# Patient Record
Sex: MALE | Race: WHITE | Employment: OTHER | URBAN - METROPOLITAN AREA
[De-identification: names, ages, dates, MRNs, and addresses within clinical notes are randomized per-mention and may not be internally consistent; named-entity substitution may affect disease eponyms.]

---

## 2017-11-01 ENCOUNTER — OFFICE VISIT (OUTPATIENT)
Dept: CARDIOLOGY CLINIC | Age: 62
End: 2017-11-01

## 2017-11-01 VITALS
BODY MASS INDEX: 37.16 KG/M2 | RESPIRATION RATE: 18 BRPM | WEIGHT: 274 LBS | DIASTOLIC BLOOD PRESSURE: 78 MMHG | HEART RATE: 81 BPM | OXYGEN SATURATION: 97 % | SYSTOLIC BLOOD PRESSURE: 126 MMHG

## 2017-11-01 DIAGNOSIS — I10 ESSENTIAL HYPERTENSION, BENIGN: ICD-10-CM

## 2017-11-01 DIAGNOSIS — E11.9 TYPE 2 DIABETES MELLITUS WITHOUT COMPLICATION, WITHOUT LONG-TERM CURRENT USE OF INSULIN (HCC): ICD-10-CM

## 2017-11-01 DIAGNOSIS — I25.10 ATHEROSCLEROSIS OF NATIVE CORONARY ARTERY OF NATIVE HEART WITHOUT ANGINA PECTORIS: ICD-10-CM

## 2017-11-01 DIAGNOSIS — E78.5 DYSLIPIDEMIA: Primary | ICD-10-CM

## 2017-11-01 DIAGNOSIS — E66.09 CLASS 2 OBESITY DUE TO EXCESS CALORIES WITH BODY MASS INDEX (BMI) OF 37.0 TO 37.9 IN ADULT, UNSPECIFIED WHETHER SERIOUS COMORBIDITY PRESENT: ICD-10-CM

## 2017-11-01 RX ORDER — FAMOTIDINE 10 MG/1
20 TABLET ORAL 2 TIMES DAILY
COMMUNITY

## 2017-11-01 RX ORDER — BUPROPION HYDROCHLORIDE 150 MG/1
TABLET, EXTENDED RELEASE ORAL 2 TIMES DAILY
COMMUNITY

## 2017-11-01 NOTE — MR AVS SNAPSHOT
Visit Information Date & Time Provider Department Dept. Phone Encounter #  
 11/1/2017  8:00 AM Som Kenny MD [facility] Anju Carballo [phone] [id number] Follow-up Instructions Return in about 1 year (around 11/1/2018). Upcoming Health Maintenance Date Due Hepatitis C Screening 1955 FOOT EXAM Q1 12/20/1963 MICROALBUMIN Q1 12/20/1963 EYE EXAM RETINAL OR DILATED Q1 12/20/1963 Pneumococcal 19-64 Medium Risk (1 of 1 - PPSV23) 12/20/1972 DTaP/Tdap/Td series (1 - Tdap) 12/20/1974 FOBT Q 1 YEAR AGE 50-75 12/20/2003 ZOSTER VACCINE AGE 60> 10/20/2013 HEMOGLOBIN A1C Q6M 12/28/2016 INFLUENZA AGE 9 TO ADULT 8/1/2017 LIPID PANEL Q1 9/28/2017 Allergies as of 11/1/2017  Review Complete On: 11/1/2017 By: Miryam Dorado LPN Severity Noted Reaction Type Reactions Beef Containing Products  07/01/2013    Other (comments) Diarrhea Milk Containing Products  07/01/2013    Other (comments) Diarrhea Pork Derived (Porcine)  07/01/2013    Other (comments) Diarrhea Current Immunizations  Never Reviewed No immunizations on file. Not reviewed this visit You Were Diagnosed With   
  
 Codes Comments Dyslipidemia    -  Primary ICD-10-CM: E78.5 ICD-9-CM: 272.4 Atherosclerosis of native coronary artery of native heart without angina pectoris     ICD-10-CM: I25.10 ICD-9-CM: 414.01 Essential hypertension, benign     ICD-10-CM: I10 
ICD-9-CM: 401.1 Class 2 obesity due to excess calories with body mass index (BMI) of 37.0 to 37.9 in adult, unspecified whether serious comorbidity present     ICD-10-CM: E66.09, K02.99 ICD-9-CM: 278.00, V85.37 Type 2 diabetes mellitus without complication, without long-term current use of insulin (HCC)     ICD-10-CM: E11.9 ICD-9-CM: 250.00 Vitals BP Pulse Resp Weight(growth percentile) SpO2 BMI  
 126/78 81 18 274 lb (124.3 kg) 97% 37.16 kg/m2 Smoking Status Never Smoker BMI and BSA Data Body Mass Index Body Surface Area  
 37.16 kg/m 2 2.51 m 2 Preferred Pharmacy Pharmacy Name Phone [address] [facility] [phone] Your Updated Medication List  
  
   
This list is accurate as of: 11/1/17  8:35 AM.  Always use your most recent med list. ALTACE 10 mg capsule Generic drug:  ramipril Take 10 mg by mouth daily. ANDROGEL TD  
by TransDERmal route. 5 pumps in the morning  
  
 aspirin 325 mg tablet Commonly known as:  ASPIRIN Take 81 mg by mouth daily. BETAMETHASONE VALERATE EX  
by Apply Externally route. buPROPion  mg SR tablet Commonly known as:  Baig Mccreedy Take  by mouth two (2) times a day. ELEMENTAL MAGNESIUM PO Take 300 mg by mouth daily. ELIDEL 1 % topical cream  
Generic drug:  pimecrolimus Apply  to affected area two (2) times a day. famotidine 10 mg tablet Commonly known as:  PEPCID Take 20 mg by mouth two (2) times a day. fish oil-dha-epa 1,200-144-216 mg Cap Take 2 Tabs by mouth two (2) times a day. folic acid 122 mcg tablet Take 800 mcg by mouth daily. glimepiride 2 mg tablet Commonly known as:  AMARYL Take 4 mg by mouth two (2) times a day. GLUCOSAMINE-CHONDROITIN PO Take  by mouth. 1500/1200 mg bid. JANUMET 50-1,000 mg per tablet Generic drug:  SITagliptin-metFORMIN Take 1 Tab by mouth two (2) times daily (with meals). ketoconazole 2 % shampoo Commonly known as:  NIZORAL Apply  to affected area daily as needed. Lancets Misc  
by Does Not Apply route. LITHIUM CITRATE PO Take  by mouth.  
  
 loratadine 10 mg tablet Commonly known as:  Ismael Van Take 10 mg by mouth.  
  
 losartan 100 mg tablet Commonly known as:  COZAAR Take 100 mg by mouth daily. multivitamin, stress formula tablet Commonly known as:  STRESS TAB Take 1 Tab by mouth daily. omeprazole 40 mg capsule Commonly known as:  PRILOSEC Take 40 mg by mouth daily. ONE TOUCH ULTRA BONUS PACK  
by Does Not Apply route. POTASSIUM CHLORIDE SR 10 MEQ TAB Take 20 mEq by mouth two (2) times a day. simvastatin 40 mg tablet Commonly known as:  ZOCOR Take  by mouth nightly. TEKTURNA 150 mg tablet Generic drug:  aliskiren Take  by mouth daily. tolterodine 2 mg tablet Commonly known as:  Tramaine Fusi Take 2 mg by mouth two (2) times a day. TRICOR 145 mg tablet Generic drug:  fenofibrate nanocrystallized Take  by mouth daily. VITAMIN D3 1,000 unit Cap Generic drug:  cholecalciferol Take 5,000 Units by mouth daily. We Performed the Following AMB POC EKG ROUTINE W/ 12 LEADS, INTER & REP [02896 CPT(R)] Follow-up Instructions Return in about 1 year (around 11/1/2018). Patient Instructions Decrease Ramipril to 1/2 tablet daily. Introducing [facility]! [facility] introduces [employer]ADE patient portal. Now you can access parts of your medical record, email your doctor's office, and request medication refills online. 1. In your internet browser, go to [email or web address] 2. Click on the First Time User? Click Here link in the Sign In box. You will see the New Member Sign Up page. 3. Enter your COADE Access Code exactly as it appears below. You will not need to use this code after youve completed the sign-up process. If you do not sign up before the expiration date, you must request a new code. · COADE Access Code: [email or web address] Expires: 1/30/2018  8:35 AM 
 
4. Enter the last four digits of your Social Security Number (xxxx) and Date of Birth (mm/dd/yyyy) as indicated and click Submit. You will be taken to the next sign-up page. 5. Create a Socr[employer] ID. This will be your Socr[employer] login ID and cannot be changed, so think of one that is secure and easy to remember. 6. Create a [employer] password. You can change your password at any time. 7. Enter your Password Reset Question and Answer. This can be used at a later time if you forget your password. 8. Enter your e-mail address. You will receive e-mail notification when new information is available in [address] 9. Click Sign Up. You can now view and download portions of your medical record. 10. Click the Download Summary menu link to download a portable copy of your medical information. If you have questions, please visit the Frequently Asked Questions section of the [employer] website. Remember, Socrata is NOT to be used for urgent needs. For medical emergencies, dial 911. Now available from your iPhone and Android! Please provide this summary of care documentation to your next provider. Your primary care clinician is listed as Shayna Richey. If you have any questions after today's visit, please call [phone].

## 2017-11-01 NOTE — PROGRESS NOTES
HISTORY OF PRESENT ILLNESS  Lisset Adames is a 59 y.o. male. Last seen 1 year ago. Problem List  Date Reviewed: 7/11/2011          Codes Class Noted    Dyslipidemia ICD-10-CM: E78.5  ICD-9-CM: 272.4  8/24/2015        Obesity ICD-10-CM: E66.9  ICD-9-CM: 278.00  6/29/2012        CAD (coronary artery disease) native artery ICD-10-CM: I25.10  ICD-9-CM: 414.01  4/13/2011    Overview Signed 7/11/2011  8:40 AM by Humaira Harvey MD     Nonobstructive plaque by cath 2007             Essential hypertension, benign ICD-10-CM: I10  ICD-9-CM: 401.1  4/13/2011        [facility] 2 diabetes mellitus) [facility] ICD-10-CM: E11.9  ICD-9-CM: 250.00  2/63/3604        Metabolic disorder mixed hyperlipidemia ICD-10-CM: S44.0  ICD-9-CM: 272.2  4/13/2011            Cardiac testing  cath 2007-Nonobstructive plaque    HPI    He feels well overall. He started going to [employer] last Friday and has been lifting weights thrice weekly with no exertional sxs. Criss Carlton He notes he felt dizzy standing up while in [facility] last month. He admits he may not have had enough water. He also notes leg cramping while hiking the [facility]. Patient denies any exertional chest pain, dyspnea, palpitations, syncope, orthopnea, edema or paroxysmal nocturnal dyspnea. He was diagnosed with depression a few months ago and has felt better on medication. Labs 9/29/17 reviewed- CRE 1.21, Hgb 6.8, , , HDL 34, LDL 78    Current Outpatient Prescriptions   Medication Sig Dispense Refill    buPROPion SR (WELLBUTRIN SR) 150 mg SR tablet Take  by mouth two (2) times a day.  famotidine (PEPCID) 10 mg tablet Take 20 mg by mouth two (2) times a day.  LITHIUM CITRATE PO Take  by mouth.  losartan (COZAAR) 100 mg tablet Take 100 mg by mouth daily.  omeprazole (PRILOSEC) 40 mg capsule Take 40 mg by mouth daily.  cholecalciferol (VITAMIN D3) 1,000 unit cap Take 5,000 Units by mouth daily.       POTASSIUM CHLORIDE SR 10 MEQ TAB Take 20 mEq by mouth two (2) times a day.  sitaGLIPtin-metFORMIN (JANUMET) 50-1,000 mg per tablet Take 1 Tab by mouth two (2) times daily (with meals).  Lancets misc by Does Not Apply route.  BLOOD-GLUCOSE METER (ONE TOUCH ULTRA BONUS PACK) by Does Not Apply route.  loratadine (CLARITIN) 10 mg tablet Take 10 mg by mouth.  BETAMETHASONE VALERATE EX by Apply Externally route.  fish oil-dha-epa 1,200-144-216 mg cap Take 2 Tabs by mouth two (2) times a day.  glimepiride (AMARYL) 2 mg tablet Take 4 mg by mouth two (2) times a day.  ketoconazole (NIZORAL) 2 % shampoo Apply  to affected area daily as needed.  simvastatin (ZOCOR) 40 mg tablet Take  by mouth nightly.  ramipril (ALTACE) 10 mg capsule Take 10 mg by mouth daily.  TESTOSTERONE (ANDROGEL TD) by TransDERmal route. 5 pumps in the morning      aspirin (ASPIRIN) 325 mg tablet Take 81 mg by mouth daily.  MAGNESIUM OXIDE/MAG AA CHELATE (ELEMENTAL MAGNESIUM PO) Take 300 mg by mouth daily.  folic acid 608 mcg tablet Take 800 mcg by mouth daily.  GLUCOSAMINE HCL/CHONDRO GIPSON A (GLUCOSAMINE-CHONDROITIN PO) Take  by mouth. 1500/1200 mg bid.  multivitamin, stress formula (STRESS TAB) tablet Take 1 Tab by mouth daily.  fenofibrate nanocrystallized (TRICOR) 145 mg tablet Take  by mouth daily.  tolterodine (DETROL) 2 mg tablet Take 2 mg by mouth two (2) times a day.  pimecrolimus (ELIDEL) 1 % topical cream Apply  to affected area two (2) times a day.  aliskiren (TEKTURNA) 150 mg tablet Take  by mouth daily. Allergies   Allergen Reactions    Beef Containing Products Other (comments)     Diarrhea    Milk Containing Products Other (comments)     Diarrhea    Pork Derived (Porcine) Other (comments)     Diarrhea     . [employer] real estate. Review of Systems   Constitutional: Negative for fever, chills, malaise/fatigue and diaphoresis.    Respiratory: Negative for cough, hemoptysis, sputum production, shortness of breath and wheezing. Cardiovascular: Negative for chest pain, palpitations, orthopnea, claudication, leg swelling and PND. Gastrointestinal: Negative for heartburn, nausea, vomiting, blood in stool and melena. Genitourinary: Negative for dysuria and flank pain. Musculoskeletal: Negative for back pain and joint pain. Positive for episodes of exertional leg cramping. Skin: Negative for rash. Neurological: Negative for focal weakness, seizures, loss of consciousness, weakness and headaches. Positive for episodes of dizziness. Endo/Heme/Allergies: Does not bruise/bleed easily. Psychiatric/Behavioral: Negative for memory loss. The patient does not have insomnia. Visit Vitals    /78    Pulse 81    Resp 18    Wt 274 lb (124.3 kg)    SpO2 97%    BMI 37.16 kg/m2     Wt Readings from Last 3 Encounters:   11/01/17 274 lb (124.3 kg)   08/29/16 273 lb 9.6 oz (124.1 kg)   08/24/15 279 lb (126.6 kg)     Physical Exam   General - well developed well nourished  Neck - JVP normal, thyroid normal  Cardiac - normal S1,S2, no murmurs, rubs or gallops. No clicks  Vascular - carotids without bruits, radials, femorals and pedal pulses equal bilateral  Lungs - clear to auscultation bilaterals, no rales, wheezing or rhonchi  Abd - soft nontender, no HSM, no abd bruits  Extremities - no edema  Skin - no rash  Neuro - nonfocal  Psych - normal mood and affect    Lipids 6/28/16 - A1c 7.8, , , HDL 29, LDL 95, glucose 179, BUN 19, Cr 1.04  Labs 9/29/17 reviewed- CRE 1.21, Hgb 6.8, , , HDL 34, LDL 78    Cardiographics  EKG 7/1/13 - SR, normal tracing  EKG 7/21/14 - sinus 56, normal   EKG 8/24/15 - sinus, normal  EKG 8/29/16 - SR, normal  EKG 11/1/17- SR, normal    ASSESSMENT and PLAN  Encounter Diagnoses   Name Primary?     Dyslipidemia Yes    Atherosclerosis of native coronary artery of native heart without angina pectoris     Essential hypertension, benign     Class 2 obesity due to excess calories with body mass index (BMI) of 37.0 to 37.9 in adult, unspecified whether serious comorbidity present     Type 2 diabetes mellitus without complication, without long-term current use of insulin [facility])      Mr. Ariana Woodruff has mild CAD by cath 2007 in the setting of DM, cardiometabolic syndrome. No sx of angina at a good functional capacity. Reserve stress testing for any concerning sxs. He is making strides to lose weight through [employer]. He seems fairly motivated. Recent A1c 6.9%. TG remains greater than 200. Normotensive on combination therapy. Strangely enough he is on Losartan and Ramipril. He does have some postural lightheadedness from time to time. Will decrease Ramipril to 5 mg daily with the idea of weaning this off completely. Recent lipids reviewed LDL-c optimized but TG greater than 200. Follow-up Disposition:  Return in about 1 year (around 11/1/2018).     Written by Tiara Rodriges, as dictated by Rebeka Milligan MD.  Rebeka Milligan MD

## 2017-11-05 RX ORDER — RAMIPRIL 5 MG/1
5 CAPSULE ORAL DAILY
Qty: 30 CAP | Refills: 3
Start: 2017-11-05 | End: 2019-01-02 | Stop reason: ALTCHOICE

## 2017-11-14 ENCOUNTER — TELEPHONE (OUTPATIENT)
Dept: CARDIOLOGY CLINIC | Age: 62
End: 2017-11-14

## 2017-11-14 NOTE — TELEPHONE ENCOUNTER
Pt would like to speak with you regarding a medication Dr. Carey Starks asked him to cut in half. Pt can be reached at [phone].     Thank you,  Jack Menchaca

## 2017-11-16 RX ORDER — RAMIPRIL 5 MG/1
5 CAPSULE ORAL DAILY
Qty: 30 CAP | Refills: 3 | Status: CANCELLED | OUTPATIENT
Start: 2017-11-16

## 2017-11-16 NOTE — TELEPHONE ENCOUNTER
Patient states he is now taking ramipril 5mg every other day. He will continue to monitor BP and notify us of any changes.

## 2017-11-16 NOTE — TELEPHONE ENCOUNTER
Please call patient in regards to decreasing the dosage of ramipril to 2.5 mg. States that he is unable to cut the tablet in half. Requests a call back at [phone]. Thanks!

## 2018-06-05 ENCOUNTER — HOSPITAL ENCOUNTER (EMERGENCY)
Age: 63
Discharge: HOME | End: 2018-06-05
Attending: EMERGENCY MEDICINE
Payer: COMMERCIAL

## 2018-06-05 ENCOUNTER — APPOINTMENT (OUTPATIENT)
Dept: CT IMAGING | Age: 63
End: 2018-06-05
Attending: EMERGENCY MEDICINE
Payer: COMMERCIAL

## 2018-06-05 VITALS
DIASTOLIC BLOOD PRESSURE: 83 MMHG | SYSTOLIC BLOOD PRESSURE: 175 MMHG | HEART RATE: 52 BPM | TEMPERATURE: 97.4 F | BODY MASS INDEX: 34.65 KG/M2 | WEIGHT: 270 LBS | HEIGHT: 74 IN | OXYGEN SATURATION: 98 % | RESPIRATION RATE: 15 BRPM

## 2018-06-05 DIAGNOSIS — R10.31 ABDOMINAL PAIN, RIGHT LOWER QUADRANT: ICD-10-CM

## 2018-06-05 DIAGNOSIS — R10.9 FLANK PAIN: Primary | ICD-10-CM

## 2018-06-05 LAB
ANION GAP BLD CALC-SCNC: 19 MMOL/L (ref 10–20)
BUN BLD-MCNC: 29 MG/DL (ref 9–20)
CA-I BLD-MCNC: 1.21 MMOL/L (ref 1.12–1.32)
CHLORIDE BLD-SCNC: 104 MMOL/L (ref 98–107)
CO2 BLD-SCNC: 22 MMOL/L (ref 21–32)
CREAT BLD-MCNC: 0.8 MG/DL (ref 0.6–1.3)
GLUCOSE BLD-MCNC: 180 MG/DL (ref 65–100)
HCT VFR BLD CALC: 47 % (ref 36.6–50.3)
POTASSIUM BLD-SCNC: 4.7 MMOL/L (ref 3.5–5.1)
SERVICE CMNT-IMP: ABNORMAL
SODIUM BLD-SCNC: 140 MMOL/L (ref 136–145)

## 2018-06-05 PROCEDURE — 99283 EMERGENCY DEPT VISIT LOW MDM: CPT

## 2018-06-05 PROCEDURE — 74177 CT ABD & PELVIS W/CONTRAST: CPT

## 2018-06-05 PROCEDURE — 96374 THER/PROPH/DIAG INJ IV PUSH: CPT

## 2018-06-05 PROCEDURE — 96361 HYDRATE IV INFUSION ADD-ON: CPT

## 2018-06-05 PROCEDURE — 96375 TX/PRO/DX INJ NEW DRUG ADDON: CPT

## 2018-06-05 PROCEDURE — 74011636320 HC RX REV CODE- 636/320: Performed by: RADIOLOGY

## 2018-06-05 PROCEDURE — 74011250636 HC RX REV CODE- 250/636: Performed by: EMERGENCY MEDICINE

## 2018-06-05 PROCEDURE — 80047 BASIC METABLC PNL IONIZED CA: CPT

## 2018-06-05 RX ORDER — MORPHINE SULFATE 4 MG/ML
4 INJECTION INTRAVENOUS
Status: COMPLETED | OUTPATIENT
Start: 2018-06-05 | End: 2018-06-05

## 2018-06-05 RX ORDER — KETOROLAC TROMETHAMINE 30 MG/ML
15 INJECTION, SOLUTION INTRAMUSCULAR; INTRAVENOUS
Status: COMPLETED | OUTPATIENT
Start: 2018-06-05 | End: 2018-06-05

## 2018-06-05 RX ADMIN — SODIUM CHLORIDE 1000 ML: 900 INJECTION, SOLUTION INTRAVENOUS at 12:49

## 2018-06-05 RX ADMIN — IOPAMIDOL 97 ML: 755 INJECTION, SOLUTION INTRAVENOUS at 14:03

## 2018-06-05 RX ADMIN — MORPHINE SULFATE 4 MG: 4 INJECTION INTRAVENOUS at 12:49

## 2018-06-05 RX ADMIN — KETOROLAC TROMETHAMINE 15 MG: 30 INJECTION, SOLUTION INTRAMUSCULAR at 12:49

## 2018-06-05 NOTE — ED PROVIDER NOTES
HPI Comments: 57 y.o. male with past medical history significant for CAD, HTN, DM, cholecyctectomy and HLD who presents from home with chief complaint of abdominal pain. The pt c/o sudden onset RLQ pain that radiates to his back with reduced appetite and nausea at 0630. The pain at its worst was a 10/10 and it is not 7/10. The pain is worsened mechanically. The pt saw his PCP today and he did not think it was appendicitis. The pt also reports a subjective fever. The pt has had a kidney stone in the past. Review of PCP labs and paperwork shows 10-15 RBC in the urine and a CBC with values within normal limits. The pt denies vomiting, changes in stool, changes in urination, and hx of kidney disease. There are no other acute medical concerns at this time. Social hx: nonsmoker, EtOH use  PCP: Jennifer Meléndez MD    Note written by Kike Gabriel, as dictated by Juanito Husain. Hilario Motta MD 12:49 PM      The history is provided by the patient. No  was used. Past Medical History:   Diagnosis Date    CAD (coronary artery disease) native artery 4/13/2011    Dyslipidemia 8/24/2015    Essential hypertension, benign 5/92/1416    Metabolic disorder mixed hyperlipidemia 4/13/2011    Obesity 6/29/2012    Type II or unspecified type diabetes mellitus without mention of complication, not stated as uncontrolled 4/13/2011       No past surgical history on file. No family history on file. Social History     Social History    Marital status:      Spouse name: N/A    Number of children: N/A    Years of education: N/A     Occupational History    Not on file.      Social History Main Topics    Smoking status: Never Smoker    Smokeless tobacco: Never Used    Alcohol use 0.0 oz/week     0 Standard drinks or equivalent per week    Drug use: Not on file    Sexual activity: Not on file     Other Topics Concern    Not on file     Social History Narrative         ALLERGIES: Beef containing products; Milk containing products; and Pork derived (porcine)    Review of Systems   Constitutional: Positive for fever (subjective). Negative for chills. HENT: Negative for ear pain and sore throat. Eyes: Negative for pain. Respiratory: Negative for chest tightness and shortness of breath. Cardiovascular: Negative for chest pain and leg swelling. Gastrointestinal: Positive for abdominal pain and nausea. Negative for constipation, diarrhea and vomiting. Genitourinary: Negative for dysuria and flank pain. Musculoskeletal: Positive for back pain. Skin: Negative for rash. Neurological: Negative for headaches. All other systems reviewed and are negative. Vitals:    06/05/18 1106   BP: 175/83   Pulse: (!) 52   Resp: 15   Temp: 97.4 °F (36.3 °C)   SpO2: 98%   Weight: 122.5 kg (270 lb)   Height: 6' 2\" (1.88 m)            Physical Exam   Constitutional: No distress. HENT:   Head: Normocephalic and atraumatic. Eyes: No scleral icterus. Neck: No tracheal deviation present. Cardiovascular: Normal rate and regular rhythm. Exam reveals no gallop and no friction rub. No murmur heard. Pulmonary/Chest: Effort normal. No respiratory distress. Abdominal: He exhibits no distension. There is tenderness (RLQ). R  CVA tenderness   Musculoskeletal: He exhibits no deformity. Neurological: He is alert. Skin: Skin is warm and dry. Psychiatric: He has a normal mood and affect. Nursing note and vitals reviewed. Note written by Kike Patrick, as dictated by Jerilyn Stovall. Denys Bills MD 12:51 PM      MDM  Number of Diagnoses or Management Options  Abdominal pain, right lower quadrant:   Flank pain:   Diagnosis management comments: 68-year-old male with a past medical history kidney stones presents from his primary care doctors with right lower quadrant and right flank pain. Differential diagnosis includes appendicitis, kidney stones, muscular skeletal pain.   - CT scan of the abdomen and pelvis: NAD  - Chem-8  - Morphine, Toradol, IV fluids  - Follow up with PCP  - Given strict return precautions    Jose Arauz, MD      Patient Progress  Patient progress: improved        ED Course       Procedures

## 2018-06-05 NOTE — ED TRIAGE NOTES
Pt. C/o right side abdominal pain that radiates around to his back since 0630 this am with nausea. Saw Family Practice this am and sent here to r/o appendix/kidney stones. No dysuria noted. Denies any CP/SOB.

## 2018-06-05 NOTE — ED NOTES
Pt has CBC & Urinalysis run today At [facility]. Per Dr. Ela Whitaker, these results are sufficient. Labs and urinalysis will not be collected at this time.  Results scanned into pt chart

## 2018-06-05 NOTE — DISCHARGE INSTRUCTIONS
Possible Appendicitis: Care Instructions  Your Care Instructions    Your doctor thinks you may have appendicitis. This means that your appendix may be infected. The appendix is a small sac that is shaped like a finger. It's attached to your large intestine. Sometimes it's hard to tell if a person has appendicitis. It is especially hard to tell in children, pregnant women, and older adults. If your doctor thinks it's possible that you have appendicitis, he or she may want to order more tests. Or your doctor may want to wait to see if your symptoms change. Your doctor thinks it's okay for you to go home right now. But you will need to watch for symptoms of appendicitis at home. If your symptoms continue or get worse, it's important to call your doctor or get medical care right away. Appendicitis can get serious very quickly. The main treatment is surgery to remove your appendix. Follow-up care is a key part of your treatment and safety. Be sure to make and go to all appointments, and call your doctor if you are having problems. It's also a good idea to know your test results and keep a list of the medicines you take. How can you care for yourself at home? · Do not eat or drink, unless your doctor says it is okay. If you need surgery, it's best to have an empty stomach. If you're thirsty, you can rinse your mouth with water. Or you can suck on hard candy. · Do not take laxatives. If you have appendicitis, they can make the appendix burst.  · Follow your doctor's instructions about taking medicines. Your doctor may tell you not to take antibiotics or pain pills. These medicines can make it harder to tell if you have appendicitis. · Watch for symptoms of appendicitis. See the When should you call for help section below. It is very important to follow your doctor's instructions about getting treatment if you have these symptoms. When should you call for help?   Call your doctor now or seek immediate medical care if:  ? · You have pain that becomes focused on one area of your belly. ? · You have new or worse belly pain. ? · You are vomiting. ? · You have a fever. ? · You cannot pass stools or gas. ? Watch closely for changes in your health, and be sure to contact your doctor if:  ? · You do not get better as expected. Where can you learn more? Go to http://valentino-reta.info/. Enter C635 in the search box to learn more about \"Possible Appendicitis: Care Instructions. \"  Current as of: May 12, 2017  Content Version: 11.4  © 6030-3766 [employer]. Care instructions adapted under license by [employer] (which disclaims liability or warranty for this information). If you have questions about a medical condition or this instruction, always ask your healthcare professional. [address] any warranty or liability for your use of this information.

## 2018-07-26 ENCOUNTER — TELEPHONE (OUTPATIENT)
Dept: CARDIOLOGY CLINIC | Age: 63
End: 2018-07-26

## 2019-01-02 ENCOUNTER — OFFICE VISIT (OUTPATIENT)
Dept: CARDIOLOGY CLINIC | Age: 64
End: 2019-01-02

## 2019-01-02 VITALS
RESPIRATION RATE: 20 BRPM | SYSTOLIC BLOOD PRESSURE: 122 MMHG | BODY MASS INDEX: 34.78 KG/M2 | HEIGHT: 74 IN | WEIGHT: 271 LBS | HEART RATE: 58 BPM | OXYGEN SATURATION: 96 % | DIASTOLIC BLOOD PRESSURE: 70 MMHG

## 2019-01-02 DIAGNOSIS — E11.9 TYPE 2 DIABETES MELLITUS WITHOUT COMPLICATION, WITHOUT LONG-TERM CURRENT USE OF INSULIN (HCC): ICD-10-CM

## 2019-01-02 DIAGNOSIS — E78.5 DYSLIPIDEMIA: Primary | ICD-10-CM

## 2019-01-02 DIAGNOSIS — I25.10 ATHEROSCLEROSIS OF NATIVE CORONARY ARTERY OF NATIVE HEART WITHOUT ANGINA PECTORIS: ICD-10-CM

## 2019-01-02 DIAGNOSIS — I10 ESSENTIAL HYPERTENSION, BENIGN: ICD-10-CM

## 2019-01-02 RX ORDER — TAMSULOSIN HYDROCHLORIDE 0.4 MG/1
0.4 CAPSULE ORAL DAILY
COMMUNITY

## 2019-01-02 NOTE — PROGRESS NOTES
HISTORY OF PRESENT ILLNESS Libby Sweeney is a 70 y.o. male. Last seen 1 year ago. Problem List  Date Reviewed: 7/11/2011 Codes Class Noted CAD (coronary artery disease) native artery ICD-10-CM: I25.10 ICD-9-CM: 414.01  4/13/2011 Overview Signed 7/11/2011  8:40 AM by Sophia Leone MD  
  Nonobstructive plaque by cath 2007 Dyslipidemia ICD-10-CM: E78.5 ICD-9-CM: 272.4  8/24/2015 Obesity ICD-10-CM: E66.9 ICD-9-CM: 278.00  6/29/2012 Essential hypertension, benign ICD-10-CM: I10 
ICD-9-CM: 401.1  4/13/2011  
   
 T2DM (type 2 diabetes mellitus) ([facility] ICD-10-CM: E11.9 ICD-9-CM: 250.00  4/13/2011 Metabolic disorder mixed hyperlipidemia ICD-10-CM: E78.2 ICD-9-CM: 272.2  4/13/2011 Cardiac testing 
cath 2007-Nonobstructive plaque EKG 1/2/19 - SB 55, otherwise normal 
 
HPI Mr. Brisa Bill reports over the past year he experienced 6 months of constant diarrhea, which has improved with dietary changes. He is followed by GI. He is trying to follow [employer]. He admits to late night snacking. His routine lab work is followed by Jimi Lay MD. He is active lifting weights at [employer] 3x/week. He reports some mild shortness of breath with going up stairs, which he states is new. Patient denies any exertional chest pain, palpitations, syncope, orthopnea, edema or paroxysmal nocturnal dyspnea. He is no longer taking medications for his mood. He is adherent with a daily baby aspirin. Current Outpatient Medications Medication Sig Dispense Refill  tamsulosin (FLOMAX) 0.4 mg capsule Take 0.4 mg by mouth daily.  losartan (COZAAR) 100 mg tablet Take 100 mg by mouth daily.  cholecalciferol (VITAMIN D3) 1,000 unit cap Take 5,000 Units by mouth daily.  sitaGLIPtin-metFORMIN (JANUMET) 50-1,000 mg per tablet Take 1 Tab by mouth two (2) times daily (with meals).  Lancets misc by Does Not Apply route.  BLOOD-GLUCOSE METER (ONE TOUCH ULTRA BONUS PACK) by Does Not Apply route.  loratadine (CLARITIN) 10 mg tablet Take 10 mg by mouth.  BETAMETHASONE VALERATE EX by Apply Externally route.  fish oil-dha-epa 1,200-144-216 mg cap Take 2 Tabs by mouth two (2) times a day.  pimecrolimus (ELIDEL) 1 % topical cream Apply  to affected area two (2) times a day.  glimepiride (AMARYL) 2 mg tablet Take 4 mg by mouth two (2) times a day.  ketoconazole (NIZORAL) 2 % shampoo Apply  to affected area daily as needed.  simvastatin (ZOCOR) 40 mg tablet Take  by mouth nightly.  folic acid 231 mcg tablet Take 800 mcg by mouth daily.  GLUCOSAMINE HCL/CHONDRO GIPSON A (GLUCOSAMINE-CHONDROITIN PO) Take  by mouth. 1500/1200 mg bid.  multivitamin, stress formula (STRESS TAB) tablet Take 1 Tab by mouth daily.  buPROPion SR (WELLBUTRIN SR) 150 mg SR tablet Take  by mouth two (2) times a day.  famotidine (PEPCID) 10 mg tablet Take 20 mg by mouth two (2) times a day.  LITHIUM CITRATE PO Take  by mouth.  omeprazole (PRILOSEC) 40 mg capsule Take 40 mg by mouth daily.  TESTOSTERONE (ANDROGEL TD) by TransDERmal route. 5 pumps in the morning  MAGNESIUM OXIDE/MAG AA CHELATE (ELEMENTAL MAGNESIUM PO) Take 300 mg by mouth daily. Allergies Allergen Reactions  Beef Containing Products Other (comments) Diarrhea  Milk Containing Products Other (comments) Diarrhea  Peanut Diarrhea  Pork Derived (Porcine) Other (comments) Diarrhea  Shrimp Diarrhea . [employer]. Review of Systems Constitutional: Negative for fever, chills, malaise/fatigue and diaphoresis. Respiratory: Negative for cough, hemoptysis, sputum production, shortness of breath and wheezing.  +mild RAYO Cardiovascular: Negative for chest pain, palpitations, orthopnea, claudication, leg swelling and PND. Gastrointestinal: Negative for heartburn, nausea, vomiting, blood in stool and melena. +diarrhea Genitourinary: Negative for dysuria and flank pain. Musculoskeletal: Negative for back pain and joint pain. Skin: Negative for rash. Neurological: Negative for focal weakness, seizures, loss of consciousness, weakness and headaches. Endo/Heme/Allergies: Does not bruise/bleed easily. Psychiatric/Behavioral: Negative for memory loss. The patient does not have insomnia. Visit Vitals /70 Pulse (!) 58 Resp 20 Ht 6' 2\" (1.88 m) Wt 271 lb (122.9 kg) SpO2 96% BMI 34.79 kg/m² Wt Readings from Last 3 Encounters:  
01/02/19 271 lb (122.9 kg) 06/05/18 270 lb (122.5 kg) 11/01/17 274 lb (124.3 kg) Physical Exam  
General - well developed well nourished Neck - JVP normal, thyroid normal 
Cardiac - normal S1,S2, no murmurs, rubs or gallops. No clicks Vascular - carotids without bruits, radials, femorals and pedal pulses equal bilateral 
Lungs - clear to auscultation bilaterals, no rales, wheezing or rhonchi Abd - soft nontender, no HSM, no abd bruits Extremities - no edema Skin - no rash Neuro - nonfocal 
Psych - normal mood and affect Lipids 6/28/16 - A1c 7.8, , , HDL 29, LDL 95, glucose 179, BUN 19, Cr 1.04 Labs 9/29/17 reviewed- CRE 1.21, Hgb 6.8, , , HDL 34, LDL 78 Cardiographics EKG 7/1/13 - SR, normal tracing EKG 7/21/14 - sinus 56, normal  
EKG 8/24/15 - sinus, normal 
EKG 8/29/16 - SR, normal 
EKG 11/1/17- SR, normal 
EKG 1/2/19 - SB 55, otherwise normal 
 
ASSESSMENT and PLAN Encounter Diagnoses Name Primary?  Dyslipidemia Yes  Atherosclerosis of native coronary artery of native heart without angina pectoris  Essential hypertension, benign  Type 2 diabetes mellitus without complication, without long-term current use of insulin ([facility] Mr. Shane Mclean has mild CAD by cath 2007 in the setting of DM, cardiometabolic syndrome. No sx of angina at a good functional capacity. His recent RAYO is likely due to deconditioning. Reserve stress testing for any concerning sxs. T2DM/Dyslipidemia. His weight is going up and down. DM managed by Dr. Mendy Huerta. HTN. Normotensive on monotherapy with Losartan. Diarrhea, unclear etiology, he is on a food elimination diet. Follow-up Disposition: 
Return in about 1 year (around 1/2/2020).  
 
Written by Corrinne Salm, as dictated by Bora Milton MD. 
Bora Milton MD

## 2019-01-02 NOTE — PROGRESS NOTES
Visit Vitals /70 Pulse (!) 58 Resp 20 Ht 6' 2\" (1.88 m) Wt 271 lb (122.9 kg) SpO2 96% BMI 34.79 kg/m² EKG VO  Denies CP, edema or dizziness Increase in RAYO Allergies to food which caused diarrhea.

## 2019-11-10 ENCOUNTER — APPOINTMENT (OUTPATIENT)
Dept: CT IMAGING | Age: 64
End: 2019-11-10
Attending: NURSE PRACTITIONER
Payer: COMMERCIAL

## 2019-11-10 ENCOUNTER — HOSPITAL ENCOUNTER (EMERGENCY)
Age: 64
Discharge: HOME | End: 2019-11-10
Attending: STUDENT IN AN ORGANIZED HEALTH CARE EDUCATION/TRAINING PROGRAM
Payer: COMMERCIAL

## 2019-11-10 VITALS
RESPIRATION RATE: 13 BRPM | HEIGHT: 74 IN | BODY MASS INDEX: 34.38 KG/M2 | OXYGEN SATURATION: 90 % | TEMPERATURE: 98.1 F | SYSTOLIC BLOOD PRESSURE: 123 MMHG | HEART RATE: 65 BPM | WEIGHT: 267.86 LBS | DIASTOLIC BLOOD PRESSURE: 62 MMHG

## 2019-11-10 DIAGNOSIS — N13.2 HYDRONEPHROSIS WITH URINARY OBSTRUCTION DUE TO RENAL CALCULUS: ICD-10-CM

## 2019-11-10 DIAGNOSIS — N20.0 LEFT RENAL STONE: Primary | ICD-10-CM

## 2019-11-10 LAB
ALBUMIN SERPL-MCNC: 3.8 G/DL (ref 3.5–5)
ALBUMIN/GLOB SERPL: 1.1 {RATIO} (ref 1.1–2.2)
ALP SERPL-CCNC: 56 U/L (ref 45–117)
ALT SERPL-CCNC: 50 U/L (ref 12–78)
ANION GAP SERPL CALC-SCNC: 14 MMOL/L (ref 5–15)
APPEARANCE UR: CLEAR
AST SERPL-CCNC: 20 U/L (ref 15–37)
BACTERIA URNS QL MICRO: NEGATIVE /HPF
BASOPHILS # BLD: 0 K/UL (ref 0–0.1)
BASOPHILS NFR BLD: 0 % (ref 0–1)
BILIRUB SERPL-MCNC: 1.1 MG/DL (ref 0.2–1)
BILIRUB UR QL: NEGATIVE
BUN SERPL-MCNC: 27 MG/DL (ref 6–20)
BUN/CREAT SERPL: 25 (ref 12–20)
CALCIUM SERPL-MCNC: 9 MG/DL (ref 8.5–10.1)
CHLORIDE SERPL-SCNC: 100 MMOL/L (ref 97–108)
CO2 SERPL-SCNC: 23 MMOL/L (ref 21–32)
COLOR UR: ABNORMAL
CREAT SERPL-MCNC: 1.07 MG/DL (ref 0.7–1.3)
DIFFERENTIAL METHOD BLD: ABNORMAL
EOSINOPHIL # BLD: 0 K/UL (ref 0–0.4)
EOSINOPHIL NFR BLD: 0 % (ref 0–7)
EPITH CASTS URNS QL MICRO: NORMAL /LPF
ERYTHROCYTE [DISTWIDTH] IN BLOOD BY AUTOMATED COUNT: 11.9 % (ref 11.5–14.5)
GLOBULIN SER CALC-MCNC: 3.6 G/DL (ref 2–4)
GLUCOSE SERPL-MCNC: 217 MG/DL (ref 65–100)
GLUCOSE UR STRIP.AUTO-MCNC: NEGATIVE MG/DL
HCT VFR BLD AUTO: 42.9 % (ref 36.6–50.3)
HGB BLD-MCNC: 14.7 G/DL (ref 12.1–17)
HGB UR QL STRIP: ABNORMAL
KETONES UR QL STRIP.AUTO: 15 MG/DL
LEUKOCYTE ESTERASE UR QL STRIP.AUTO: NEGATIVE
LIPASE SERPL-CCNC: 102 U/L (ref 73–393)
LYMPHOCYTES # BLD: 1.2 K/UL
LYMPHOCYTES NFR BLD: 9 % (ref 12–49)
MCH RBC QN AUTO: 31.8 PG (ref 26–34)
MCHC RBC AUTO-ENTMCNC: 34.3 G/DL (ref 30–36.5)
MCV RBC AUTO: 92.9 FL (ref 80–99)
MONOCYTES # BLD: 1.6 K/UL (ref 0–1)
MONOCYTES NFR BLD: 11 % (ref 5–13)
NEUTS SEG # BLD: 11.5 K/UL (ref 1.8–8)
NEUTS SEG NFR BLD: 80 % (ref 32–75)
NITRITE UR QL STRIP.AUTO: NEGATIVE
PH UR STRIP: 6 [PH] (ref 5–8)
PLATELET # BLD AUTO: 191 K/UL (ref 150–400)
PMV BLD AUTO: 10.9 FL (ref 8.9–12.9)
POTASSIUM SERPL-SCNC: 4.2 MMOL/L (ref 3.5–5.1)
PROT SERPL-MCNC: 7.4 G/DL (ref 6.4–8.2)
PROT UR STRIP-MCNC: ABNORMAL MG/DL
RBC # BLD AUTO: 4.62 M/UL (ref 4.1–5.7)
RBC #/AREA URNS HPF: NORMAL /HPF (ref 0–5)
SODIUM SERPL-SCNC: 137 MMOL/L (ref 136–145)
SP GR UR REFRACTOMETRY: 1.03 (ref 1–1.03)
UROBILINOGEN UR QL STRIP.AUTO: 0.2 EU/DL (ref 0.2–1)
WBC # BLD AUTO: 14.3 K/UL (ref 4.1–11.1)
WBC URNS QL MICRO: NORMAL /HPF (ref 0–4)

## 2019-11-10 PROCEDURE — 36415 COLL VENOUS BLD VENIPUNCTURE: CPT

## 2019-11-10 PROCEDURE — 74011250636 HC RX REV CODE- 250/636: Performed by: NURSE PRACTITIONER

## 2019-11-10 PROCEDURE — 81001 URINALYSIS AUTO W/SCOPE: CPT

## 2019-11-10 PROCEDURE — 96361 HYDRATE IV INFUSION ADD-ON: CPT

## 2019-11-10 PROCEDURE — 87086 URINE CULTURE/COLONY COUNT: CPT

## 2019-11-10 PROCEDURE — 74176 CT ABD & PELVIS W/O CONTRAST: CPT

## 2019-11-10 PROCEDURE — 80053 COMPREHEN METABOLIC PANEL: CPT

## 2019-11-10 PROCEDURE — 83690 ASSAY OF LIPASE: CPT

## 2019-11-10 PROCEDURE — 96374 THER/PROPH/DIAG INJ IV PUSH: CPT

## 2019-11-10 PROCEDURE — 99284 EMERGENCY DEPT VISIT MOD MDM: CPT

## 2019-11-10 PROCEDURE — 85025 COMPLETE CBC W/AUTO DIFF WBC: CPT

## 2019-11-10 PROCEDURE — 96375 TX/PRO/DX INJ NEW DRUG ADDON: CPT

## 2019-11-10 RX ORDER — ACETAMINOPHEN AND CODEINE PHOSPHATE 300; 30 MG/1; MG/1
1 TABLET ORAL
COMMUNITY

## 2019-11-10 RX ORDER — HYDROCHLOROTHIAZIDE 12.5 MG/1
12.5 TABLET ORAL DAILY
COMMUNITY

## 2019-11-10 RX ORDER — ONDANSETRON 4 MG/1
4 TABLET, ORALLY DISINTEGRATING ORAL
Qty: 20 TAB | Refills: 0 | Status: SHIPPED | OUTPATIENT
Start: 2019-11-10

## 2019-11-10 RX ORDER — PRAVASTATIN SODIUM 40 MG/1
40 TABLET ORAL
COMMUNITY

## 2019-11-10 RX ORDER — KETOROLAC TROMETHAMINE 10 MG/1
10 TABLET, FILM COATED ORAL
Qty: 30 TAB | Refills: 0 | Status: SHIPPED | OUTPATIENT
Start: 2019-11-10

## 2019-11-10 RX ORDER — KETOROLAC TROMETHAMINE 30 MG/ML
15 INJECTION, SOLUTION INTRAMUSCULAR; INTRAVENOUS
Status: COMPLETED | OUTPATIENT
Start: 2019-11-10 | End: 2019-11-10

## 2019-11-10 RX ORDER — OXYCODONE AND ACETAMINOPHEN 5; 325 MG/1; MG/1
1 TABLET ORAL
Qty: 12 TAB | Refills: 0 | Status: SHIPPED | OUTPATIENT
Start: 2019-11-10 | End: 2019-11-13

## 2019-11-10 RX ORDER — ONDANSETRON 2 MG/ML
4 INJECTION INTRAMUSCULAR; INTRAVENOUS ONCE
Status: COMPLETED | OUTPATIENT
Start: 2019-11-10 | End: 2019-11-10

## 2019-11-10 RX ADMIN — FAMOTIDINE 20 MG: 10 INJECTION, SOLUTION INTRAVENOUS at 17:37

## 2019-11-10 RX ADMIN — SODIUM CHLORIDE 1000 ML: 900 INJECTION, SOLUTION INTRAVENOUS at 18:42

## 2019-11-10 RX ADMIN — KETOROLAC TROMETHAMINE 15 MG: 30 INJECTION, SOLUTION INTRAMUSCULAR at 17:36

## 2019-11-10 RX ADMIN — SODIUM CHLORIDE 1000 ML: 900 INJECTION, SOLUTION INTRAVENOUS at 17:34

## 2019-11-10 RX ADMIN — ONDANSETRON 4 MG: 2 INJECTION INTRAMUSCULAR; INTRAVENOUS at 17:36

## 2019-11-10 NOTE — DISCHARGE INSTRUCTIONS
Patient Education        Learning About Hydronephrosis  What is hydronephrosis? Hydronephrosis is swelling of the kidneys. It is caused by a buildup of urine. This condition can happen if a tube that drains urine from your kidneys is blocked. The blockage can come from within the urinary tract or from pressure outside of the tract. Pregnancy is an example of an outside (external) cause. This condition is often caused by a blockage such as a kidney stone, tumor, or blood clot. It also can be caused by a problem in your urinary system that you were born with (congenital problem). What are the symptoms? Some of the common symptoms are:  · Pain in one or both sides. · Stomach pain. · Blood in your urine. Some people have no symptoms. How is it diagnosed? Your doctor will do an ultrasound to look for a blockage in your urinary system. An ultrasound allows your doctor to see a picture of the organs and other structures in your belly (abdomen). You also may need blood and urine tests. How is it treated? Your treatment depends on the cause of the swelling. If it is caused by a blockage, your treatment will depend on the type of blockage you have. If the blockage is caused by a kidney stone, you may wait for the stone to pass. If hydronephrosis happens during pregnancy, it usually clears up on its own. You may need to have urine drained from your bladder or kidneys. A urinary catheter is a small, flexible tube that can be inserted through the urethra and into the bladder, allowing urine to drain. A nephrostomy catheter is a thin tube placed into your kidney to drain urine. Sometimes surgery is needed to clear the blockage. If you have a blockage, you should begin to feel better after the blockage is gone. Many people recover and have no long-term problems. But some may have kidney damage. If hydronephrosis was left untreated for a long time, the damage can be severe.  Severe damage will require further treatment. Follow-up care is a key part of your treatment and safety. Be sure to make and go to all appointments, and call your doctor if you are having problems. It's also a good idea to know your test results and keep a list of the medicines you take. Where can you learn more? Go to http://valentino-reta.info/. Enter S386 in the search box to learn more about \"Learning About Hydronephrosis. \"  Current as of: October 31, 2018  Content Version: 12.2  © 2777-8278 [employer]. Care instructions adapted under license by [employer] (which disclaims liability or warranty for this information). If you have questions about a medical condition or this instruction, always ask your healthcare professional. [address] any warranty or liability for your use of this information. Patient Education        Learning About Diet for Kidney Stone Prevention  What are kidney stones? Kidney stones are made of salts and minerals in the urine that form small \"roz. \" Stones can form in the kidneys and the ureters (the tubes that lead from the kidneys to the bladder). They can also form in the bladder. Stones may not cause a problem as long as they stay in the kidneys. But they can cause sudden, severe pain. Pain is most likely when the stones travel from the kidneys to the bladder. Kidney stones can cause bloody urine. Kidney stones often run in families. You are more likely to get them if you don't drink enough fluids, mainly water. Certain foods and drinks and some dietary supplements may also increase your risk for kidney stones if you consume too much of them. What can you do to prevent kidney stones? Changing what you eat may not prevent all types of kidney stones. But for people who have a history of certain kinds of kidney stones, some changes in diet may help. A dietitian can help you set up a meal plan that includes healthy, low-oxalate choices. Here are some general guidelines to get you started. Plan your meals and snacks around foods that are low in oxalate. These foods include:  · Corn, kale, parsnips, and squash,. · Beef, chicken, pork, turkey, and fish. · Milk, butter, cheese, and yogurt. You can eat certain foods that are medium-high in oxalate, but eat them only once in a while. These foods include:  · Bread. · Brown rice. · English muffins. · Figs. · Popcorn. · String beans. · Tomatoes. Limit very high-oxalate foods, including:  · Black tea. · Coffee. · Chocolate. · Dark green vegetables. · Nuts. Here are some other things you can do to help prevent kidney stones. · Drink plenty of fluids. If you have kidney, heart, or liver disease and have to limit fluids, talk with your doctor before you increase the amount of fluids you drink. · Do not take more than the recommended daily dose of vitamins C and D.  · Limit the salt in your diet. · Eat a balanced diet that is not too high in protein. Follow-up care is a key part of your treatment and safety. Be sure to make and go to all appointments, and call your doctor if you are having problems. It's also a good idea to know your test results and keep a list of the medicines you take. Where can you learn more? Go to http://valentino-reta.info/. Enter C138 in the search box to learn more about \"Learning About Diet for Kidney Stone Prevention. \"  Current as of: November 7, 2018  Content Version: 12.2  © 3758-2720 [email or web address]. Care instructions adapted under license by [employer] (which disclaims liability or warranty for this information). If you have questions about a medical condition or this instruction, always ask your healthcare professional. [place] [address] any warranty or liability for your use of this information.          Patient Education        Kidney Stone: Care Instructions  Your Care Instructions    Kidney stones are formed when salts, minerals, and other substances normally found in the urine clump together. They can be as small as grains of sand or, rarely, as large as golf balls. While the stone is traveling through the ureter, which is the tube that carries urine from the kidney to the bladder, you will probably feel pain. The pain may be mild or very severe. You may also have some blood in your urine. As soon as the stone reaches the bladder, any intense pain should go away. If a stone is too large to pass on its own, you may need a medical procedure to help you pass the stone. The doctor has checked you carefully, but problems can develop later. If you notice any problems or new symptoms, get medical treatment right away. Follow-up care is a key part of your treatment and safety. Be sure to make and go to all appointments, and call your doctor if you are having problems. It's also a good idea to know your test results and keep a list of the medicines you take. How can you care for yourself at home? · Drink plenty of fluids, enough so that your urine is light yellow or clear like water. If you have kidney, heart, or liver disease and have to limit fluids, talk with your doctor before you increase the amount of fluids you drink. · Take pain medicines exactly as directed. Call your doctor if you think you are having a problem with your medicine. ? If the doctor gave you a prescription medicine for pain, take it as prescribed. ? If you are not taking a prescription pain medicine, ask your doctor if you can take an over-the-counter medicine. Read and follow all instructions on the label. · Your doctor may ask you to strain your urine so that you can collect your kidney stone when it passes. You can use a kitchen strainer or a tea strainer to catch the stone. Store it in a plastic bag until you see your doctor again. Preventing future kidney stones  Some changes in your diet may help prevent kidney stones. Depending on the cause of your stones, your doctor may recommend that you:  · Drink plenty of fluids, enough so that your urine is light yellow or clear like water. If you have kidney, heart, or liver disease and have to limit fluids, talk with your doctor before you increase the amount of fluids you drink. · Limit coffee, tea, and alcohol. Also avoid grapefruit juice. · Do not take more than the recommended daily dose of vitamins C and D.  · Avoid antacids such as Gaviscon, Maalox, Mylanta, or Tums. · Limit the amount of salt (sodium) in your diet. · Eat a balanced diet that is not too high in protein. · Limit foods that are high in a substance called oxalate, which can cause kidney stones. These foods include dark green vegetables, rhubarb, chocolate, wheat bran, nuts, cranberries, and beans. When should you call for help? Call your doctor now or seek immediate medical care if:    · You cannot keep down fluids.     · Your pain gets worse.     · You have a fever or chills.     · You have new or worse pain in your back just below your rib cage (the flank area).     · You have new or more blood in your urine.    Watch closely for changes in your health, and be sure to contact your doctor if:    · You do not get better as expected. Where can you learn more? Go to http://valentino-reta.info/. Enter I164 in the search box to learn more about \"Kidney Stone: Care Instructions. \"  Current as of: October 31, 2018  Content Version: 12.2  © 4349-7341 [employer]. Care instructions adapted under license by [employer] (which disclaims liability or warranty for this information). If you have questions about a medical condition or this instruction, always ask your healthcare professional. [address] any warranty or liability for your use of this information.

## 2019-11-10 NOTE — ED TRIAGE NOTES
Pt presents to ED with c/ abdominal pain to left flank since yesterday. Pt states he starting vomiting last night.

## 2019-11-10 NOTE — ED NOTES
Received shift report from ARCHANA, RN. Patient is preparing for DC per NP; is still finishing his 2nd liter of NS. Appears comfortable at this time.  Wife has gone across the street to  prescriptions

## 2019-11-10 NOTE — ED PROVIDER NOTES
HPI   Patient is a 67-year-old male who presents to the emergency room today with a 2-day history of evolving left flank pain and left lower abdominal pain. Patient reports associated nausea and vomiting, no diarrhea which she states is unusual for him. Patient reports no bowel movement in approximately 24 Hours period. Patient reports he did pass flatus prior to ER arrival.  Has had minimal p.o. intake in the last 48 hours. Has a history of cholecystectomy 15 years ago, last colonoscopy at the age of 59 was \"normal\" no history of diverticulosis or polyps removed. Patient has a history of renal stones followed by [facility]. Additional symptoms report patient is hot, wife reports he is throwing off the covers, did not take temperature at home. He has been taking Tylenol 3 since this morning every 4 hours for diffuse abdominal pain back pain. Last dose around 3:00. Denies ill contacts, had a flu shot approximately a month ago. In general feels lethargic. The pain is described as cramping and sharp it is exacerbated by certain positional change, taking a deep breath. Denies testicular pain. Past Medical History:   Diagnosis Date    CAD (coronary artery disease) native artery 4/13/2011    Dyslipidemia 8/24/2015    Essential hypertension, benign 7/63/2268    Metabolic disorder mixed hyperlipidemia 4/13/2011    Obesity 6/29/2012    Type II or unspecified type diabetes mellitus without mention of complication, not stated as uncontrolled 4/13/2011       History reviewed. No pertinent surgical history. History reviewed. No pertinent family history.     Social History     Socioeconomic History    Marital status:      Spouse name: Not on file    Number of children: Not on file    Years of education: Not on file    Highest education level: Not on file   Occupational History    Not on file   Social Needs    Financial resource strain: Not on file    Food insecurity:     Worry: Not on file     Inability: Not on file    Transportation needs:     Medical: Not on file     Non-medical: Not on file   Tobacco Use    Smoking status: Never Smoker    Smokeless tobacco: Never Used   Substance and Sexual Activity    Alcohol use: Yes     Alcohol/week: 0.0 standard drinks    Drug use: Not on file    Sexual activity: Not on file   Lifestyle    Physical activity:     Days per week: Not on file     Minutes per session: Not on file    Stress: Not on file   Relationships    Social connections:     Talks on phone: Not on file     Gets together: Not on file     Attends Christianity service: Not on file     Active member of club or organization: Not on file     Attends meetings of clubs or organizations: Not on file     Relationship status: Not on file    Intimate partner violence:     Fear of current or ex partner: Not on file     Emotionally abused: Not on file     Physically abused: Not on file     Forced sexual activity: Not on file   Other Topics Concern    Not on file   Social History Narrative    Not on file         ALLERGIES: Beef containing products; Cinnamon; Corn; Milk containing products; Peanut; Pork derived (porcine); Rice; Shellfish derived; Shrimp; and Wheat    Review of Systems   Constitutional: Positive for appetite change, chills, fatigue and fever. Negative for activity change, diaphoresis and unexpected weight change. HENT: Negative. Eyes: Negative. Respiratory: Negative for cough, choking, chest tightness, shortness of breath, wheezing and stridor. Cardiovascular: Negative for chest pain, palpitations and leg swelling. Gastrointestinal: Positive for abdominal pain, constipation, nausea and vomiting. Negative for abdominal distention, anal bleeding, blood in stool, diarrhea and rectal pain. Endocrine: Negative for cold intolerance, heat intolerance, polydipsia, polyphagia and polyuria. Genitourinary: Positive for flank pain.  Negative for decreased urine volume, difficulty urinating, dysuria, frequency, hematuria, penile pain, scrotal swelling, testicular pain and urgency. Musculoskeletal: Positive for back pain. Negative for gait problem, joint swelling, myalgias and neck pain. Skin: Negative. Neurological: Negative for tremors, syncope, weakness, light-headedness and headaches. Vitals:    11/10/19 1647   BP: 145/76   Pulse: 66   Resp: 16   Temp: 98.1 °F (36.7 °C)   SpO2: 95%   Weight: 121.5 kg (267 lb 13.7 oz)   Height: 6' 2\" (1.88 m)            Physical Exam   Constitutional: He is oriented to person, place, and time. He appears well-developed and well-nourished. Non-toxic appearance. He appears ill. No distress. HENT:   Head: Normocephalic and atraumatic. Mouth/Throat: Oropharynx is clear and moist. No oropharyngeal exudate. Eyes: Pupils are equal, round, and reactive to light. EOM are normal.   Cardiovascular: Normal rate, regular rhythm, normal heart sounds and intact distal pulses. Exam reveals no gallop and no friction rub. No murmur heard. Pulmonary/Chest: Effort normal and breath sounds normal. No stridor. No respiratory distress. He has no wheezes. He has no rhonchi. He has no rales. Abdominal: Normal appearance, normal aorta and bowel sounds are normal. He exhibits no distension, no abdominal bruit, no ascites and no mass. There is no hepatosplenomegaly, splenomegaly or hepatomegaly. There is tenderness in the suprapubic area and left lower quadrant. There is CVA tenderness. There is no rigidity, no rebound, no guarding, no tenderness at McBurney's point and negative Moeller's sign. Hernia confirmed negative in the ventral area. Neurological: He is alert and oriented to person, place, and time. Skin: Skin is warm and dry. Capillary refill takes less than 2 seconds.         MDM  Number of Diagnoses or Management Options  Hydronephrosis with urinary obstruction due to renal calculus: established and improving  Left renal stone: established and improving  Diagnosis management comments: Clinical exam and findings consistent with left renal stone with hydronephrosis stable for outpatient treatment. Mild WBC elevation, urine without UTI. Patient remains on Flomax, analgesics, antiemetics ordered. Follow-up with [facility], stone hotline number provided to patient. Amount and/or Complexity of Data Reviewed  Clinical lab tests: reviewed    Risk of Complications, Morbidity, and/or Mortality  Presenting problems: low  Diagnostic procedures: minimal  Management options: low           Procedures    6:41 PM  CT scan lab work urinalysis findings with patient. Exam and studies consistent with left-sided renal stone with hydronephrosis. Pain markedly improved with Toradol, no nausea. Will hang second liter of saline stable for outpatient management is established with [facility] Dr. Arthur Gosselin.

## 2019-11-10 NOTE — ED NOTES
Bedside and Verbal shift change report given to JessiRN (oncoming nurse) by Ingrid De Los Santos (offgoing nurse). Report included the following information SBAR, ED Summary, MAR and Recent Results.

## 2019-11-11 NOTE — ED NOTES
The patient was discharged home by [facility] Officer, NP in stable condition. The patient is alert and oriented, in no respiratory distress. The patient's diagnosis, condition and treatment were explained. The patient expressed understanding. No prescriptions given. No work/school note given. A discharge plan has been developed. A  was not involved in the process. Aftercare instructions were given. Pt ambulatory out of the ED with family.

## 2019-11-12 LAB
BACTERIA SPEC CULT: ABNORMAL
BACTERIA SPEC CULT: ABNORMAL
CC UR VC: ABNORMAL
SERVICE CMNT-IMP: ABNORMAL

## 2020-01-06 ENCOUNTER — OFFICE VISIT (OUTPATIENT)
Dept: CARDIOLOGY CLINIC | Age: 65
End: 2020-01-06

## 2020-01-06 VITALS
OXYGEN SATURATION: 96 % | DIASTOLIC BLOOD PRESSURE: 72 MMHG | RESPIRATION RATE: 18 BRPM | HEIGHT: 74 IN | SYSTOLIC BLOOD PRESSURE: 128 MMHG | WEIGHT: 264 LBS | HEART RATE: 72 BPM | BODY MASS INDEX: 33.88 KG/M2

## 2020-01-06 DIAGNOSIS — E78.5 DYSLIPIDEMIA: ICD-10-CM

## 2020-01-06 DIAGNOSIS — E11.9 TYPE 2 DIABETES MELLITUS WITHOUT COMPLICATION, WITHOUT LONG-TERM CURRENT USE OF INSULIN (HCC): ICD-10-CM

## 2020-01-06 DIAGNOSIS — I25.10 ATHEROSCLEROSIS OF NATIVE CORONARY ARTERY OF NATIVE HEART WITHOUT ANGINA PECTORIS: ICD-10-CM

## 2020-01-06 DIAGNOSIS — I77.819 DILATION OF AORTA (HCC): ICD-10-CM

## 2020-01-06 DIAGNOSIS — I51.7 LVH (LEFT VENTRICULAR HYPERTROPHY): Primary | ICD-10-CM

## 2020-01-06 DIAGNOSIS — I10 ESSENTIAL HYPERTENSION, BENIGN: ICD-10-CM

## 2020-01-06 RX ORDER — SEMAGLUTIDE 1.34 MG/ML
INJECTION, SOLUTION SUBCUTANEOUS
COMMUNITY
Start: 2019-12-31

## 2020-01-06 RX ORDER — ASPIRIN 81 MG/1
TABLET ORAL DAILY
COMMUNITY

## 2020-01-06 RX ORDER — LEVOTHYROXINE SODIUM 75 UG/1
TABLET ORAL
COMMUNITY

## 2020-01-06 NOTE — PROGRESS NOTES
Constance Camacho MD [facility]  [address]  [place], [address] [facility]    Office [phone]  Fax [phone]  Cell [phone]      Tootie Gates is a 75 y.o. male. Last seen by me 1 year ago. DIAGNOSES  Encounter Diagnoses     ICD-10-CM ICD-9-CM   1. LVH (left ventricular hypertrophy) I51.7 429.3   2. Atherosclerosis of native coronary artery of native heart without angina pectoris I25.10 414.01   3. Dyslipidemia E78.5 272.4   4. Type 2 diabetes mellitus without complication, without long-term current use of insulin (HCC) E11.9 250.00   5. Essential hypertension, benign I10 401.1   6. Dilation of aorta [facility]) I77.819 447.70       ASSESSMENT/PLAN    CAD by cath 2007 in the setting of DM, cardiometabolic syndrome. No sx of angina at a good functional capacity. Reserve stress testing for any concerning sxs. - Continue ASA 81mg/d    T2DM/dyslipidemia. His weight is down from 6 months ago. I do not have recent lipids or A1c. DM managed by Dr. Pete Smith. HTN, normotensive on monotherapy with Losartan. Severe LVH by echo Nov 2019 from [facility]. His EKG does not demonstrate increased voltage. His aorta was also noted to be dilated. - Will evaluate further with cardiac MRI    Unclear why he was seen by S pre-op urology procedure? Phone follow up after reviewing tests    HPI    Tootie Gates reports he is feeling well overall. He is active with no exertional sxs. He has mild SOB with stairs at times. He does reports a dry hacking cough ongoing for about 5 years. Patient denies any exertional chest pain, palpitations, syncope, orthopnea, edema or paroxysmal nocturnal dyspnea. He reports he and his wife are more conscious about their food choices. He underwent lithotripsy last fall for kidney stones. His routine lab work is followed by Jorge Mallory MD.     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       Cardiac testing  cath 2007-Nonobstructive plaque  EKG 1/2/19 - SB 55, otherwise normal    Echo 11/21/19 ([facility]) Normal LV size and function. EF 55-60%. Severe concentric LVH. G1DD. Structurally normal valves with mild TR. Dilated aortic root at 4.5 cm. Echo 11/21/19 - moderate LVH, EF 60%      Current Outpatient Medications   Medication Sig Dispense Refill    OZEMPIC 0.25 mg or 0.5 mg(2 mg/1.5 mL) sub-q pen       levothyroxine (SYNTHROID) 75 mcg tablet Take  by mouth Daily (before breakfast).  aspirin delayed-release 81 mg tablet Take  by mouth daily.  pravastatin (PRAVACHOL) 40 mg tablet Take 40 mg by mouth nightly.  tamsulosin (FLOMAX) 0.4 mg capsule Take 0.4 mg by mouth daily.  famotidine (PEPCID) 10 mg tablet Take 20 mg by mouth two (2) times a day.  losartan (COZAAR) 100 mg tablet Take 100 mg by mouth daily.  cholecalciferol (VITAMIN D3) 1,000 unit cap Take 5,000 Units by mouth daily.  sitaGLIPtin-metFORMIN (JANUMET) 50-1,000 mg per tablet Take 1 Tab by mouth two (2) times daily (with meals).  Lancets misc by Does Not Apply route.  BLOOD-GLUCOSE METER (ONE TOUCH ULTRA BONUS PACK) by Does Not Apply route.  loratadine (CLARITIN) 10 mg tablet Take 10 mg by mouth.  fish oil-dha-epa 1,200-144-216 mg cap Take 2 Tabs by mouth two (2) times a day.  pimecrolimus (ELIDEL) 1 % topical cream Apply  to affected area two (2) times a day.  glimepiride (AMARYL) 2 mg tablet Take 4 mg by mouth two (2) times a day.  ketoconazole (NIZORAL) 2 % shampoo Apply  to affected area daily as needed.  MAGNESIUM OXIDE/MAG AA CHELATE (ELEMENTAL MAGNESIUM PO) Take 300 mg by mouth daily.  folic acid 093 mcg tablet Take 800 mcg by mouth daily.  GLUCOSAMINE HCL/CHONDRO GIPSON A (GLUCOSAMINE-CHONDROITIN PO) Take  by mouth. 1500/1200 mg bid.  multivitamin, stress formula (STRESS TAB) tablet Take 1 Tab by mouth daily.  hydroCHLOROthiazide (HYDRODIURIL) 12.5 mg tablet Take 12.5 mg by mouth daily.       acetaminophen-codeine (TYLENOL-CODEINE #3) 300-30 mg per tablet Take 1 Tab by mouth every four (4) hours as needed for Pain.  ondansetron (ZOFRAN ODT) 4 mg disintegrating tablet Take 1 Tab by mouth every eight (8) hours as needed for Nausea. 20 Tab 0    ketorolac (TORADOL) 10 mg tablet Take 1 Tab by mouth every six (6) hours as needed for Pain. 30 Tab 0    buPROPion SR (WELLBUTRIN SR) 150 mg SR tablet Take  by mouth two (2) times a day.  LITHIUM CITRATE PO Take  by mouth.  omeprazole (PRILOSEC) 40 mg capsule Take 40 mg by mouth daily.  BETAMETHASONE VALERATE EX by Apply Externally route.  simvastatin (ZOCOR) 40 mg tablet Take  by mouth nightly.  TESTOSTERONE (ANDROGEL TD) by TransDERmal route. 5 pumps in the morning       Allergies   Allergen Reactions    Beef Containing Products Other (comments)     Diarrhea    Black Pepper Other (comments)    Cinnamon Diarrhea    Corn Diarrhea    Flaxseed Other (comments)    Milk Containing Products Other (comments)     Diarrhea    Peanut Diarrhea    Pork Derived (Porcine) Other (comments)     Diarrhea    Rice Diarrhea    Shellfish Derived Diarrhea    Shrimp Diarrhea    Wheat Diarrhea     . Commercial real estate. Review of Systems   Constitutional: Negative for fever, chills, malaise/fatigue and diaphoresis. Respiratory: Negative for cough, hemoptysis, sputum production, and wheezing.  +mild SOB  Cardiovascular: Negative for chest pain, palpitations, orthopnea, claudication, leg swelling and PND. Gastrointestinal: Negative for heartburn, nausea, vomiting, blood in stool and melena. Genitourinary: Negative for dysuria and flank pain. Musculoskeletal: Negative for back pain and joint pain. Skin: Negative for rash. Neurological: Negative for focal weakness, seizures, loss of consciousness, weakness and headaches. Endo/Heme/Allergies: Does not bruise/bleed easily. Psychiatric/Behavioral: Negative for memory loss. The patient does not have insomnia. Visit Vitals  /72 (BP 1 Location: Left arm, BP Patient Position: Sitting)   Pulse 72   Resp 18   Ht 6' 2\" (1.88 m)   Wt 264 lb (119.7 kg)   SpO2 96%   BMI 33.90 kg/m²     Wt Readings from Last 3 Encounters:   01/06/20 264 lb (119.7 kg)   11/10/19 267 lb 13.7 oz (121.5 kg)   01/02/19 271 lb (122.9 kg)     Physical Exam   General - well developed well nourished  Neck - JVP normal, thyroid normal  Cardiac - normal S1,S2, no murmurs, rubs or gallops. No clicks  Vascular - carotids without bruits, radials, femorals and pedal pulses equal bilateral  Lungs - clear to auscultation bilaterals, no rales, wheezing or rhonchi  Abd - soft nontender, no HSM, no abd bruits  Extremities - no edema  Skin - no rash  Neuro - nonfocal  Psych - normal mood and affect    Lipids 6/28/16 - A1c 7.8, , , HDL 29, LDL 95, glucose 179, BUN 19, Cr 1.04  Labs 9/29/17 reviewed- CRE 1.21, Hgb 6.8, , , HDL 34, LDL 78    Cardiographics  EKG 7/1/13 - SR, normal tracing  EKG 7/21/14 - sinus 56, normal   EKG 8/24/15 - sinus, normal  EKG 8/29/16 - SR, normal  EKG 11/1/17- SR, normal  EKG 1/2/19 - SB 55, otherwise normal  Echo 11/21/19 - moderate LVH, EF 60%  EKG 1/6/19 - SR 57, normal         Written by Jn Sloan, as dictated by Marguerite Sorensen M.D.      Marguerite Sorensen MD

## 2020-01-06 NOTE — PROGRESS NOTES
Pete Harvey is a 75 y.o. male    Chief Complaint   Patient presents with    Annual Exam    Coronary Artery Disease    Hypertension    Cholesterol Problem     Visit Vitals  /72 (BP 1 Location: Left arm, BP Patient Position: Sitting)   Pulse 72   Resp 18   Ht 6' 2\" (1.88 m)   Wt 264 lb (119.7 kg)   SpO2 96%   BMI 33.90 kg/m²       1. Have you been to the ER, urgent care clinic since your last visit? Hospitalized since your last visit? YES, WC for abdominal pain and vomiting and kidney stones on11/10/19. 2. Have you seen or consulted any other health care providers outside of the [address] [place] [address] since your last visit? Include any pap smears or colon screening.  No

## 2020-01-06 NOTE — LETTER
1/6/20 Patient: Jay Johnson YOB: 1955 Date of Visit: 1/6/2020 Noemy Torres MD 
[facility] [address] VIA Facsimile: [phone] Dear Noemy Torres MD, Thank you for referring Mr. Simon Whitmore to [address] for evaluation. My notes for this consultation are attached. If you have questions, please do not hesitate to call me. I look forward to following your patient along with you. Sincerely, Som Kenny MD

## 2020-03-02 ENCOUNTER — HOSPITAL ENCOUNTER (OUTPATIENT)
Dept: MRI IMAGING | Age: 65
Discharge: HOME | End: 2020-03-02
Attending: SPECIALIST
Payer: COMMERCIAL

## 2020-03-02 VITALS — WEIGHT: 260 LBS | BODY MASS INDEX: 33.38 KG/M2

## 2020-03-02 DIAGNOSIS — I25.10 ATHEROSCLEROSIS OF NATIVE CORONARY ARTERY OF NATIVE HEART WITHOUT ANGINA PECTORIS: ICD-10-CM

## 2020-03-02 DIAGNOSIS — I10 ESSENTIAL HYPERTENSION, BENIGN: ICD-10-CM

## 2020-03-02 DIAGNOSIS — I51.7 LVH (LEFT VENTRICULAR HYPERTROPHY): ICD-10-CM

## 2020-03-02 PROCEDURE — 75561 CARDIAC MRI FOR MORPH W/DYE: CPT

## 2020-03-02 PROCEDURE — 77030021566

## 2020-03-02 PROCEDURE — 74011250636 HC RX REV CODE- 250/636: Performed by: INTERNAL MEDICINE

## 2020-03-02 PROCEDURE — A9577 INJ MULTIHANCE: HCPCS | Performed by: INTERNAL MEDICINE

## 2020-03-02 RX ADMIN — GADOBENATE DIMEGLUMINE 30 ML: 529 INJECTION, SOLUTION INTRAVENOUS at 12:28

## 2020-03-06 ENCOUNTER — TELEPHONE (OUTPATIENT)
Dept: CARDIOLOGY CLINIC | Age: 65
End: 2020-03-06

## 2020-03-06 NOTE — TELEPHONE ENCOUNTER
cath 2007-Nonobstructive plaque  EKG 1/2/19 - SB 55, otherwise normal    Echo 11/21/19 ([facility]) Normal LV size and function. EF 55-60%. Severe concentric LVH. G1DD. Structurally normal valves with mild TR. Dilated aortic root at 4.5 cm. Echo 11/21/19 - moderate LVH, EF 60%    Cardiac MRI 3/3/20 - Severe hypertrophy of the septal wall and mod hypertrophy of the posterior wall. EF 65%. On EGE and LGE there is discrete area of myocardial fibrosis and scar involving the mid myocardial wall of the basal inferior wall as well as the bsal anteroseptal wall - differential diagnoses old healed myocarditis or possibly replacement fibrosis from hypertrophy. Mildly dilated sinus of Valsalva, measuring 41 mm. Mildly dilated ascending aorta measuring 41x40 mm. Mildly dilated aortic arch measuring 29 mm. I have left a voicemail for Mr. Urbano to discuss Cardiac MRI results. Discussed findings with Dr. Jordon Steve noting moderate to severe hypertrophy with normal LV function. Mildly dilated ascending aorta and aortic arch. Plan to continue home monitoring of blood pressure to ensure good control. Follow up annually with repeat Echo.

## 2020-03-11 ENCOUNTER — TELEPHONE (OUTPATIENT)
Dept: CARDIOLOGY CLINIC | Age: 65
End: 2020-03-11

## 2022-07-10 ENCOUNTER — APPOINTMENT (OUTPATIENT)
Dept: CT IMAGING | Age: 67
End: 2022-07-10
Attending: EMERGENCY MEDICINE
Payer: COMMERCIAL

## 2022-07-10 ENCOUNTER — APPOINTMENT (OUTPATIENT)
Dept: GENERAL RADIOLOGY | Age: 67
End: 2022-07-10
Attending: EMERGENCY MEDICINE
Payer: COMMERCIAL

## 2022-07-10 ENCOUNTER — HOSPITAL ENCOUNTER (EMERGENCY)
Age: 67
Discharge: HOME | End: 2022-07-10
Attending: EMERGENCY MEDICINE | Admitting: EMERGENCY MEDICINE
Payer: COMMERCIAL

## 2022-07-10 VITALS
OXYGEN SATURATION: 94 % | SYSTOLIC BLOOD PRESSURE: 129 MMHG | WEIGHT: 261 LBS | RESPIRATION RATE: 18 BRPM | DIASTOLIC BLOOD PRESSURE: 73 MMHG | HEART RATE: 70 BPM | BODY MASS INDEX: 33.5 KG/M2 | HEIGHT: 74 IN | TEMPERATURE: 98.4 F

## 2022-07-10 DIAGNOSIS — S20.212A CONTUSION OF LEFT CHEST WALL, INITIAL ENCOUNTER: ICD-10-CM

## 2022-07-10 DIAGNOSIS — S42.202A CLOSED FRACTURE OF PROXIMAL END OF LEFT HUMERUS, UNSPECIFIED FRACTURE MORPHOLOGY, INITIAL ENCOUNTER: ICD-10-CM
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
## 2022-07-10 LAB
ALBUMIN SERPL-MCNC: 4 G/DL (ref 3.5–5)
ALBUMIN/GLOB SERPL: 1.2 {RATIO} (ref 1.1–2.2)
ALP SERPL-CCNC: 60 U/L (ref 45–117)
ALT SERPL-CCNC: 45 U/L (ref 12–78)
ANION GAP SERPL CALC-SCNC: 15 MMOL/L (ref 5–15)
AST SERPL-CCNC: 27 U/L (ref 15–37)
BASOPHILS # BLD: 0 K/UL (ref 0–0.1)
BASOPHILS NFR BLD: 0 % (ref 0–1)
BILIRUB SERPL-MCNC: 0.4 MG/DL (ref 0.2–1)
BUN SERPL-MCNC: 21 MG/DL (ref 6–20)
BUN/CREAT SERPL: 21 (ref 12–20)
CALCIUM SERPL-MCNC: 8.9 MG/DL (ref 8.5–10.1)
CHLORIDE SERPL-SCNC: 102 MMOL/L (ref 97–108)
CO2 SERPL-SCNC: 19 MMOL/L (ref 21–32)
CREAT SERPL-MCNC: 0.99 MG/DL (ref 0.7–1.3)
DIFFERENTIAL METHOD BLD: ABNORMAL
EOSINOPHIL # BLD: 0 K/UL (ref 0–0.4)
EOSINOPHIL NFR BLD: 0 % (ref 0–7)
ERYTHROCYTE [DISTWIDTH] IN BLOOD BY AUTOMATED COUNT: 11.7 % (ref 11.5–14.5)
GLOBULIN SER CALC-MCNC: 3.4 G/DL (ref 2–4)
GLUCOSE SERPL-MCNC: 321 MG/DL (ref 65–100)
HCT VFR BLD AUTO: 40.1 % (ref 36.6–50.3)
HGB BLD-MCNC: 14.1 G/DL (ref 12.1–17)
IMM GRANULOCYTES # BLD AUTO: 0.1 K/UL (ref 0–0.04)
IMM GRANULOCYTES NFR BLD AUTO: 1 % (ref 0–0.5)
LYMPHOCYTES # BLD: 1.4 K/UL (ref 0.8–3.5)
LYMPHOCYTES NFR BLD: 15 % (ref 12–49)
MCH RBC QN AUTO: 32.4 PG (ref 26–34)
MCHC RBC AUTO-ENTMCNC: 35.2 G/DL (ref 30–36.5)
MCV RBC AUTO: 92.2 FL (ref 80–99)
MONOCYTES # BLD: 0.7 K/UL (ref 0–1)
MONOCYTES NFR BLD: 8 % (ref 5–13)
NEUTS SEG # BLD: 7.1 K/UL (ref 1.8–8)
NEUTS SEG NFR BLD: 76 % (ref 32–75)
NRBC # BLD: 0 K/UL (ref 0–0.01)
NRBC BLD-RTO: 0 PER 100 WBC
PLATELET # BLD AUTO: 209 K/UL (ref 150–400)
PMV BLD AUTO: 10.6 FL (ref 8.9–12.9)
POTASSIUM SERPL-SCNC: 4.8 MMOL/L (ref 3.5–5.1)
PROT SERPL-MCNC: 7.4 G/DL (ref 6.4–8.2)
RBC # BLD AUTO: 4.35 M/UL (ref 4.1–5.7)
SODIUM SERPL-SCNC: 136 MMOL/L (ref 136–145)
WBC # BLD AUTO: 9.4 K/UL (ref 4.1–11.1)

## 2022-07-10 PROCEDURE — 73060 X-RAY EXAM OF HUMERUS: CPT

## 2022-07-10 PROCEDURE — 96375 TX/PRO/DX INJ NEW DRUG ADDON: CPT

## 2022-07-10 PROCEDURE — 36415 COLL VENOUS BLD VENIPUNCTURE: CPT

## 2022-07-10 PROCEDURE — 85025 COMPLETE CBC W/AUTO DIFF WBC: CPT

## 2022-07-10 PROCEDURE — 72125 CT NECK SPINE W/O DYE: CPT

## 2022-07-10 PROCEDURE — 74011250636 HC RX REV CODE- 250/636: Performed by: EMERGENCY MEDICINE

## 2022-07-10 PROCEDURE — 80053 COMPREHEN METABOLIC PANEL: CPT

## 2022-07-10 PROCEDURE — 73030 X-RAY EXAM OF SHOULDER: CPT

## 2022-07-10 PROCEDURE — 96374 THER/PROPH/DIAG INJ IV PUSH: CPT

## 2022-07-10 PROCEDURE — 96376 TX/PRO/DX INJ SAME DRUG ADON: CPT

## 2022-07-10 PROCEDURE — 99285 EMERGENCY DEPT VISIT HI MDM: CPT

## 2022-07-10 PROCEDURE — 71260 CT THORAX DX C+: CPT

## 2022-07-10 PROCEDURE — 74011000636 HC RX REV CODE- 636: Performed by: EMERGENCY MEDICINE

## 2022-07-10 RX ORDER — SODIUM CHLORIDE 0.9 % (FLUSH) 0.9 %
5-40 SYRINGE (ML) INJECTION EVERY 8 HOURS
Status: DISCONTINUED | OUTPATIENT
Start: 2022-07-10 | End: 2022-07-11 | Stop reason: HOSPADM

## 2022-07-10 RX ORDER — MORPHINE SULFATE 4 MG/ML
4 INJECTION INTRAVENOUS ONCE
Status: COMPLETED | OUTPATIENT
Start: 2022-07-10 | End: 2022-07-10

## 2022-07-10 RX ORDER — OXYCODONE AND ACETAMINOPHEN 5; 325 MG/1; MG/1
1 TABLET ORAL
Qty: 12 TABLET | Refills: 0 | Status: SHIPPED | OUTPATIENT
Start: 2022-07-10 | End: 2022-07-13

## 2022-07-10 RX ORDER — ONDANSETRON 2 MG/ML
4 INJECTION INTRAMUSCULAR; INTRAVENOUS
Status: COMPLETED | OUTPATIENT
Start: 2022-07-10 | End: 2022-07-10

## 2022-07-10 RX ORDER — SODIUM CHLORIDE 0.9 % (FLUSH) 0.9 %
5-40 SYRINGE (ML) INJECTION AS NEEDED
Status: DISCONTINUED | OUTPATIENT
Start: 2022-07-10 | End: 2022-07-11 | Stop reason: HOSPADM

## 2022-07-10 RX ADMIN — ONDANSETRON 4 MG: 2 INJECTION INTRAMUSCULAR; INTRAVENOUS at 20:22

## 2022-07-10 RX ADMIN — MORPHINE SULFATE 4 MG: 4 INJECTION, SOLUTION INTRAMUSCULAR; INTRAVENOUS at 22:38

## 2022-07-10 RX ADMIN — IOPAMIDOL 100 ML: 755 INJECTION, SOLUTION INTRAVENOUS at 21:39

## 2022-07-10 RX ADMIN — MORPHINE SULFATE 4 MG: 4 INJECTION, SOLUTION INTRAMUSCULAR; INTRAVENOUS at 20:23
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
## 2022-07-11 NOTE — ED NOTES
The patient was discharged home by [facility] in stable condition. The patient is alert and oriented, in no respiratory distress and discharge vital signs obtained. The patient's diagnosis, condition and treatment were explained. The patient expressed understanding. Prescriptions e-scribed to pharmacy. No work/school note given. A discharge plan has been developed. A  was not involved in the process. Aftercare instructions were given. Pt ambulatory out of the ED with spouse.
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
## 2022-12-28 ENCOUNTER — ANESTHESIA EVENT (OUTPATIENT)
Dept: ENDOSCOPY | Age: 67
End: 2022-12-28
Payer: COMMERCIAL

## 2022-12-28 ENCOUNTER — ANESTHESIA (OUTPATIENT)
Dept: ENDOSCOPY | Age: 67
End: 2022-12-28
Payer: COMMERCIAL

## 2022-12-28 ENCOUNTER — HOSPITAL ENCOUNTER (OUTPATIENT)
Age: 67
Setting detail: OUTPATIENT SURGERY
Discharge: HOME | End: 2022-12-28
Attending: INTERNAL MEDICINE | Admitting: INTERNAL MEDICINE
Payer: COMMERCIAL

## 2022-12-28 VITALS
BODY MASS INDEX: 34.77 KG/M2 | WEIGHT: 270.95 LBS | HEART RATE: 59 BPM | TEMPERATURE: 97.7 F | HEIGHT: 74 IN | RESPIRATION RATE: 22 BRPM | OXYGEN SATURATION: 97 % | DIASTOLIC BLOOD PRESSURE: 73 MMHG | SYSTOLIC BLOOD PRESSURE: 137 MMHG

## 2022-12-28 LAB
GLUCOSE BLD STRIP.AUTO-MCNC: 124 MG/DL (ref 65–117)
SERVICE CMNT-IMP: ABNORMAL

## 2022-12-28 PROCEDURE — 82962 GLUCOSE BLOOD TEST: CPT

## 2022-12-28 PROCEDURE — 74011250636 HC RX REV CODE- 250/636: Performed by: NURSE ANESTHETIST, CERTIFIED REGISTERED

## 2022-12-28 PROCEDURE — 2709999900 HC NON-CHARGEABLE SUPPLY: Performed by: INTERNAL MEDICINE

## 2022-12-28 PROCEDURE — 77030021593 HC FCPS BIOP ENDOSC BSC -A: Performed by: INTERNAL MEDICINE

## 2022-12-28 PROCEDURE — 76060000031 HC ANESTHESIA FIRST 0.5 HR: Performed by: INTERNAL MEDICINE

## 2022-12-28 PROCEDURE — 76040000019: Performed by: INTERNAL MEDICINE

## 2022-12-28 PROCEDURE — 88305 TISSUE EXAM BY PATHOLOGIST: CPT

## 2022-12-28 RX ORDER — NALOXONE HYDROCHLORIDE 0.4 MG/ML
0.4 INJECTION, SOLUTION INTRAMUSCULAR; INTRAVENOUS; SUBCUTANEOUS
Status: DISCONTINUED | OUTPATIENT
Start: 2022-12-28 | End: 2022-12-28 | Stop reason: HOSPADM

## 2022-12-28 RX ORDER — EPINEPHRINE 0.1 MG/ML
1 INJECTION INTRACARDIAC; INTRAVENOUS
Status: DISCONTINUED | OUTPATIENT
Start: 2022-12-28 | End: 2022-12-28 | Stop reason: HOSPADM

## 2022-12-28 RX ORDER — NYSTATIN 100000 U/G
CREAM TOPICAL 2 TIMES DAILY
COMMUNITY

## 2022-12-28 RX ORDER — FENTANYL CITRATE 50 UG/ML
100 INJECTION, SOLUTION INTRAMUSCULAR; INTRAVENOUS
Status: DISCONTINUED | OUTPATIENT
Start: 2022-12-28 | End: 2022-12-28 | Stop reason: HOSPADM

## 2022-12-28 RX ORDER — SODIUM CHLORIDE 9 MG/ML
50 INJECTION, SOLUTION INTRAVENOUS CONTINUOUS
Status: DISCONTINUED | OUTPATIENT
Start: 2022-12-28 | End: 2022-12-28 | Stop reason: HOSPADM

## 2022-12-28 RX ORDER — ATROPINE SULFATE 0.1 MG/ML
0.5 INJECTION INTRAVENOUS
Status: DISCONTINUED | OUTPATIENT
Start: 2022-12-28 | End: 2022-12-28 | Stop reason: HOSPADM

## 2022-12-28 RX ORDER — DEXTROMETHORPHAN/PSEUDOEPHED 2.5-7.5/.8
1.2 DROPS ORAL
Status: DISCONTINUED | OUTPATIENT
Start: 2022-12-28 | End: 2022-12-28 | Stop reason: HOSPADM

## 2022-12-28 RX ORDER — PROPOFOL 10 MG/ML
INJECTION, EMULSION INTRAVENOUS AS NEEDED
Status: DISCONTINUED | OUTPATIENT
Start: 2022-12-28 | End: 2022-12-28 | Stop reason: HOSPADM

## 2022-12-28 RX ORDER — SODIUM CHLORIDE 9 MG/ML
INJECTION, SOLUTION INTRAVENOUS
Status: DISCONTINUED | OUTPATIENT
Start: 2022-12-28 | End: 2022-12-28 | Stop reason: HOSPADM

## 2022-12-28 RX ORDER — PROPOFOL 10 MG/ML
INJECTION, EMULSION INTRAVENOUS
Status: DISCONTINUED | OUTPATIENT
Start: 2022-12-28 | End: 2022-12-28 | Stop reason: HOSPADM

## 2022-12-28 RX ORDER — MIDAZOLAM HYDROCHLORIDE 1 MG/ML
.25-5 INJECTION, SOLUTION INTRAMUSCULAR; INTRAVENOUS
Status: DISCONTINUED | OUTPATIENT
Start: 2022-12-28 | End: 2022-12-28 | Stop reason: HOSPADM

## 2022-12-28 RX ORDER — FLUMAZENIL 0.1 MG/ML
0.2 INJECTION INTRAVENOUS
Status: DISCONTINUED | OUTPATIENT
Start: 2022-12-28 | End: 2022-12-28 | Stop reason: HOSPADM

## 2022-12-28 RX ORDER — FENOFIBRATE 67 MG/1
CAPSULE ORAL
COMMUNITY

## 2022-12-28 RX ADMIN — PROPOFOL 50 MG: 10 INJECTION, EMULSION INTRAVENOUS at 13:38

## 2022-12-28 RX ADMIN — SODIUM CHLORIDE: 9 INJECTION, SOLUTION INTRAVENOUS at 13:07

## 2022-12-28 RX ADMIN — PROPOFOL 175 MCG/KG/MIN: 10 INJECTION, EMULSION INTRAVENOUS at 13:38

## 2022-12-28 NOTE — ANESTHESIA POSTPROCEDURE EVALUATION
Procedure(s):  COLONOSCOPY  COLON BIOPSY. MAC    Anesthesia Post Evaluation      Multimodal analgesia: multimodal analgesia used between 6 hours prior to anesthesia start to PACU discharge  Patient location during evaluation: bedside  Patient participation: complete - patient participated  Level of consciousness: awake and sleepy but conscious  Pain score: 0  Pain management: adequate  Airway patency: patent  Anesthetic complications: no  Cardiovascular status: acceptable  Respiratory status: acceptable  Hydration status: acceptable  Comments: Immediate cv/pulm status within acceptable preop limits. Post anesthesia nausea and vomiting:  controlled  Final Post Anesthesia Temperature Assessment:  Normothermia (36.0-37.5 degrees C)      INITIAL Post-op Vital signs:   Vitals Value Taken Time   /70 12/28/22 1357   Temp     Pulse 74 12/28/22 1359   Resp 22 12/28/22 1359   SpO2 91 % 12/28/22 1359   Vitals shown include unvalidated device data.

## 2022-12-28 NOTE — PERIOP NOTES
Report from Ko Pickett CRNA, see anesthesia record. ABD remains soft and non-tender post procedure. Pt has no complaints at this time and tolerated the procedure well. Endoscope was pre-cleaned at bedside immediately following procedure by MATILDA AT [facility].

## 2022-12-28 NOTE — ANESTHESIA PREPROCEDURE EVALUATION
Relevant Problems   No relevant active problems       Anesthetic History               Review of Systems / Medical History  Patient summary reviewed, nursing notes reviewed and pertinent labs reviewed    Pulmonary                   Neuro/Psych              Cardiovascular    Hypertension          CAD    Exercise tolerance: >4 METS  Comments: Denied recent cv/pulm complaints or changes. GI/Hepatic/Renal               Comments: Denied active reflux symptoms Endo/Other    Diabetes    Morbid obesity     Other Findings              Physical Exam    Airway  Mallampati: II  TM Distance: > 6 cm  Neck ROM: normal range of motion   Mouth opening: Normal     Cardiovascular  Regular rate and rhythm,  S1 and S2 normal,  no murmur, click, rub, or gallop  Rhythm: regular  Rate: normal         Dental      Comments: No loose teeth.    Pulmonary  Breath sounds clear to auscultation               Abdominal  Abdominal exam normal       Other Findings            Anesthetic Plan    ASA: 3  Anesthesia type: MAC          Induction: Intravenous  Anesthetic plan and risks discussed with: Patient and Family

## 2022-12-28 NOTE — PROCEDURES
[address]  [phone]      2022    Colonoscopy Procedure Note  Gertrudis Springer  :  1955  [facility] Medical Record Number: [id number]    Indications:     Diarrhea  PCP:  Jennifer Fields MD  Anesthesia/Sedation: see nursing notes  Endoscopist:  Dr. Madi Armas  Assistants: None  Complications:  None  Estimate Blood Loss:  None    Permit:  The indications, risks, benefits and alternatives were reviewed with the patient or their decision maker who was provided an opportunity to ask questions and all questions were answered. The specific risks of colonoscopy with conscious sedation were reviewed, including but not limited to anesthetic complication, bleeding, adverse drug reaction, missed lesion, infection, IV site reactions, and intestinal perforation which would lead to the need for surgical repair. Alternatives to colonoscopy including radiographic imaging, observation without testing, or laboratory testing were reviewed including the limitations of those alternatives. After considering the options and having all their questions answered, the patient or their decision maker provided both verbal and written consent to proceed. Procedure in Detail:  After obtaining informed consent, positioning of the patient in the left lateral decubitus position, and conduction of a pre-procedure pause or \"time out\" the endoscope was introduced into the anus and advanced to the cecum, which was identified by the ileocecal valve. The quality of the colonic preparation was poor. A careful inspection was made as the colonoscope was withdrawn, findings and interventions are described below.     Appendiceal orifice photographed    Findings:   normal  no mucosal lesion appreciated  Large sections of mucosa including the entire cecum obscured by waste the residual which cannot be aspirated through the scope. The majority of the waste residual is mild like consistency or semisolid and cannot be aspirated through the scope. Specimens:     Random biopsies    Implants: none    Complications:   None; patient tolerated the procedure well. Estimated blood loss: none    Impression:  Overflow incontinence    Recommendations:     - Await pathology. Recommended daily MiraLAX for the likely diagnosis of overflow incontinence. This is a permanent recommendation. Thank you for entrusting me with this patient's care. Please do not hesitate to contact me with any questions or if I can be of assistance with any of your other patients' GI needs.     Signed By: Mir Oviedo MD                        December 28, 2022

## 2022-12-28 NOTE — PROGRESS NOTES
[place] Kerry  1955  [id number]    Situation:  Verbal report received from: [address]  Procedure: Procedure(s):  COLONOSCOPY  COLON BIOPSY    Background:    Preoperative diagnosis: Acute diarrhea [R19.7]  Lower abdominal pain [R10.30]  Postoperative diagnosis: Inadequate Prep    :  Dr. Roe Wooten  Assistant(s): Endoscopy Technician-1: Clarisa [facility]  Endoscopy RN-1: Birdie Trujillo RN    Specimens:   ID Type Source Tests Collected by Time Destination   1 : Random Colon Biopsies Preservative   Bruce Rosas MD 12/28/2022 1345 Pathology     H. Pylori  no    Assessment:  Anesthesia gave intra-procedure sedation and medications, see anesthesia flow sheet yes    Intravenous fluids: NS@ KVO     Vital signs stable yes    Abdominal assessment: round and soft yes    Recommendation:  Discharge patient per MD order yes.   Return to floor na  Family or Friend family  Permission to share finding with family or friend yes

## 2022-12-28 NOTE — PROGRESS NOTES
Endoscopy discharge instructions have been reviewed and given to patient. The patient verbalized understanding and acceptance of instructions. Dr. Franco Barraza discussed with patient procedure findings and next steps. Hearing aids returned to patient.

## 2022-12-28 NOTE — DISCHARGE INSTRUCTIONS
Chaim Prado  [id number]  1955    DISCHARGE INSTRUCTIONS    Results:  Overflow incontinence    Recommendations:     - Await pathology. Recommended daily MiraLAX for the likely diagnosis of overflow incontinence. This is a permanent recommendation. Discomfort:  Redness at IV site- apply warm compress to area; if redness or soreness persist- contact your physician. There may be a slight amount of blood passed from the rectum. Gaseous discomfort - walking, belching will help relieve any discomfort. You may not operate a vehicle for 12 hours. You may not engage in an occupation involving machinery or appliances for rest of today. You may not drink alcoholic beverages for at least 12 hours. Avoid making any critical decisions for at least 24 hours. DIET:   High fiber diet. Medications:                Resume usual medications today   ACTIVITY:  You may resume your normal daily activities it is recommended that you spend the remainder of the day resting -  avoid any strenuous activity. CALL M.D. ANY SIGN OF:   Increasing pain, nausea, vomiting  Abdominal distension (swelling)  New increased bleeding (oral or rectal)  Fever (chills)  Pain in chest area  Bloody discharge from nose or mouth  Shortness of breath     Follow-up Instructions:  Daily MiraLAX is a permanent recommendation.   Unless there are new problems follow-up colonoscopy not recommended      DISCHARGE SUMMARY from Nurse    The following personal items collected during your admission are returned to you:   Dental Appliance: Dental Appliances: None  Vision: Visual Aid: None  Hearing Aid:    Jewelry:    Clothing:    Other Valuables:    Valuables sent to safe:

## 2022-12-28 NOTE — H&P
Patient Name: Quinten Urbano  Gender: Male   (age): 1955 (74)     Referring Physician:    Davida Sorto  [facility]ly [facility] [id number], [place], [address] [facility]  [phone] (phone)  [phone] (fax)     Chief Complaint:    diarrhea     History of Present Illness:  Mr. Corina Cortez is a 74year old male with PMH of DM, HTN, arthritis, and allergies who presents with with diarrhea. He frequently travels for work. He has multiple food allergies that have cause him diarrhea in the past. He had acute onset of diarrhea starting at the end of August. Denies melena or hematochezia. Denies abdominal pain.     Denies family Hx of CRC    Endorses previous daily use of NSAIDs but has reduced his use significantly  Past Medical History  Medical Conditions:   Arthritis  Cataracts  Diabetes Mellitus  Herpes  High blood pressure  High cholesterol  Name of blood thinner:  Surgical Procedures:   Gallbladder surgery,   Dx Studies:   Colonoscopy, 2012  CT Scan  Procedure/OP Notes, 2012, COLON  Medications:   aspirin 81 mg Once daily  bupropion HCl 150 mg  cholecalciferol (vitamin D3) 5,000 unit Twice daily  Elidel 1% Apply a small amount as needed  Fish Oil 360-1,200 mg Take 1 capsule by mouth once a day  folic acid 739 mcg Take 1 tablet by mouth once a day  Fungus Fighter  glimepiride 4 mg Take 1 tablet by mouth twice a day  glucosamine-chondroitin 1,500-1,200 mg/30 mL Twice daily  Janumet 50-1,000 mg Twice daily  ketoconazole 2% Apply as directed as needed  loratadine 10 mg Take 1 tablet by mouth once a day as needed  losartan 100 mg Once daily  Luxiq 0.12% Apply foam as needed  metronidazole 500 mg  multivitamin Once daily  Probiotic 15 billion cell  simvastatin 40 mg Take 1 tablet by mouth every night  tamsulosin 0.4 mg Once daily  Allergies:   Patient has no known drug allergies  Beef  Pepper  Pork  Shrimp  Immunizations:   COVID Vaccine, 2021  Influenza, seasonal, injectable, 2021  Social History  Alcohol:   Consume Alcohol 1-2 times a week. Tobacco:   Never smoker  Drugs:   None  Exercise:   Exercise 3 or more times a week 3 times a week. Caffeine:   Daily. 2.  Marital Status:         Occupation:     (Real Estate)     Family History   No history of Colon Cancer, Colon Polyps, Esophogeal Cancer, GI Cancers, IBD (Crohn's or UC), Liver disease  Review of Systems:  Cardiovascular: Denies chest pain, irregular heart beat, palpitations, peripheral edema, syncope, Sweats. Constitutional: Presents suffers from fatigue. Denies fever, loss of appetite, weight gain, weight loss. ENMT: Presents suffers from hearing loss. Denies nose bleeds, sore throat. Endocrine: Denies excessive thirst, heat intolerance. Eyes: Denies loss of vision. Gastrointestinal: Presents suffers from change in bowel habits, diarrhea, Bloating/gas, Stool incontinence. Denies abdominal pain, abdominal swelling, constipation, heartburn, jaundice, nausea, rectal bleeding, stomach cramps, vomiting, dysphagia, rectal pain, hematemesis. Genitourinary: Presents suffers from incontinence. Denies dark urine, dysuria, frequent urination, hematuria. Hematologic/Lymphatic: Denies easy bruising, prolonged bleeding. Integumentary: Denies itching, rashes, sun sensitivity. Musculoskeletal: Presents suffers from arthritis, joint pain. Denies back pain, gout, muscle weakness, stiffness. Neurological: Denies dizziness, fainting, frequent headaches, memory loss. Psychiatric: Presents suffers from depression. Denies anxiety, difficulty sleeping, hallucinations, nervousness, panic attacks, paranoia. Respiratory: Presents suffers from cough, dyspnea. Denies wheezing. Vital Signs:  See nursing notes    Physical Exam:  Constitutional:  Appearance: No distress, appears comfortable. Communication: Understands/receives spoken information. Skin:  Inspection: No rash, no jaundice.   Palpation: No subcutaneous nodules. Head/face: Inspection: Normacephalic, atraumatic. Palpation: normal.  Eyes:  Conjunctivae/lids: Normal.  Pupils/irises: Pupils equal, round and normal.  ENMT:  External: Normal.  Hearing: Normal.  Neck:  Neck: Normal appearance, trachea midline. Jugular veins: No JVD noted. Respiratory:  Effort: Normal respiratory effort, comfortable, speaks in complete sentences. Auscultation: normal breath sounds, no rubs, wheezes or rhonchi. Cardiovascular:  Palpation: normal size,normal rythym. Auscultation: normal, S1 and S2,no gallops,no rubs or murmurs. Gastrointestinal/Abdomen:  Abdomen: non-distended, nontender. Liver/Spleen: normal,normal size,. Musculoskeletal:  Gait/station: normal.  Digits/nails: Normal, no spooning of nails, clubbing, or splinter hemorrhages,no clubbing, cyanosis, petechiae or other inflammatory conditions. Back: no kyphosis or scoliosis. Muscles: normal strength and tone, no atrophy or abnormal movements. Psychiatric:  Judgment/insight: Normal,normal judgement, normal insight. Orientation: oriented to time, space and person. Memory: normal short term memory, normal long term memory, no memory loss. Mood and affect: Normal mood, affect full,no evidence of depression, anxiety or agitation. Lymphatic:  Neck: No lymphadenopathy in the cervical or supraclavicular chain. Other: No periumbilic lymphadenopathy. Impressions:   Acute diarrhea  Screening colonoscopy (Screening for colonic neoplasia)    NSAID long-term use      Plan:   I've discussed colonoscopy possible biopsy, polypectomy, cautery, injection, alternatives, complications including but not limited to pain, cardiopulmonary event, bleeding, perforation requiring additional blood transfusion or operative repair; all questions answered.

## 2023-05-18 RX ORDER — TAMSULOSIN HYDROCHLORIDE 0.4 MG/1
0.4 CAPSULE ORAL DAILY
COMMUNITY

## 2023-05-18 RX ORDER — BUPROPION HYDROCHLORIDE 150 MG/1
TABLET, EXTENDED RELEASE ORAL 2 TIMES DAILY
COMMUNITY

## 2023-05-18 RX ORDER — FENOFIBRATE 67 MG/1
CAPSULE ORAL
COMMUNITY

## 2023-05-18 RX ORDER — LORATADINE 10 MG/1
10 TABLET ORAL
COMMUNITY

## 2023-05-18 RX ORDER — ONDANSETRON 4 MG/1
4 TABLET, ORALLY DISINTEGRATING ORAL EVERY 8 HOURS PRN
COMMUNITY
Start: 2019-11-10

## 2023-05-18 RX ORDER — KETOROLAC TROMETHAMINE 10 MG/1
10 TABLET, FILM COATED ORAL EVERY 6 HOURS PRN
COMMUNITY
Start: 2019-11-10

## 2023-05-18 RX ORDER — LOSARTAN POTASSIUM 100 MG/1
100 TABLET ORAL DAILY
COMMUNITY

## 2023-05-18 RX ORDER — FAMOTIDINE 10 MG
20 TABLET ORAL 2 TIMES DAILY
COMMUNITY

## 2023-05-18 RX ORDER — SIMVASTATIN 40 MG
TABLET ORAL
COMMUNITY

## 2023-05-18 RX ORDER — ACETAMINOPHEN AND CODEINE PHOSPHATE 300; 30 MG/1; MG/1
1 TABLET ORAL EVERY 4 HOURS PRN
COMMUNITY

## 2023-05-18 RX ORDER — SEMAGLUTIDE 1.34 MG/ML
INJECTION, SOLUTION SUBCUTANEOUS
COMMUNITY
Start: 2019-12-31

## 2023-05-18 RX ORDER — HYDROCHLOROTHIAZIDE 12.5 MG/1
12.5 TABLET ORAL DAILY
COMMUNITY

## 2023-05-18 RX ORDER — KETOCONAZOLE 20 MG/ML
SHAMPOO TOPICAL DAILY PRN
COMMUNITY

## 2023-05-18 RX ORDER — OMEPRAZOLE 40 MG/1
40 CAPSULE, DELAYED RELEASE ORAL DAILY
COMMUNITY

## 2023-05-18 RX ORDER — ASPIRIN 81 MG/1
TABLET ORAL DAILY
COMMUNITY

## 2023-05-18 RX ORDER — LANCETS 30 GAUGE
EACH MISCELLANEOUS
COMMUNITY

## 2023-05-18 RX ORDER — LEVOTHYROXINE SODIUM 0.07 MG/1
TABLET ORAL
COMMUNITY

## 2023-05-18 RX ORDER — PIMECROLIMUS 10 MG/G
CREAM TOPICAL 2 TIMES DAILY
COMMUNITY

## 2023-05-18 RX ORDER — PRAVASTATIN SODIUM 40 MG
40 TABLET ORAL NIGHTLY
COMMUNITY

## 2023-05-18 RX ORDER — GLIMEPIRIDE 2 MG/1
4 TABLET ORAL 2 TIMES DAILY
COMMUNITY

## 2023-05-18 RX ORDER — UREA 10 %
800 LOTION (ML) TOPICAL DAILY
COMMUNITY

## 2023-05-18 RX ORDER — NYSTATIN 100000 U/G
CREAM TOPICAL 2 TIMES DAILY
COMMUNITY

## 2024-05-03 ENCOUNTER — TRANSCRIBE ORDERS (OUTPATIENT)
Facility: HOSPITAL | Age: 69
End: 2024-05-03

## 2024-05-03 ENCOUNTER — HOSPITAL ENCOUNTER (OUTPATIENT)
Facility: HOSPITAL | Age: 69
Discharge: HOME | End: 2024-05-03
Payer: COMMERCIAL

## 2024-05-03 DIAGNOSIS — K59.09 CHRONIC CONSTIPATION WITH OVERFLOW: ICD-10-CM

## 2024-05-03 DIAGNOSIS — R15.2 INCONTINENCE OF FECES WITH FECAL URGENCY: ICD-10-CM

## 2024-05-03 DIAGNOSIS — R15.9 INCONTINENCE OF FECES WITH FECAL URGENCY: ICD-10-CM

## 2024-05-03 DIAGNOSIS — Z91.018 MULTIPLE FOOD ALLERGIES: Primary | ICD-10-CM

## 2024-05-03 DIAGNOSIS — Z91.018 MULTIPLE FOOD ALLERGIES: ICD-10-CM

## 2024-05-03 PROCEDURE — 74018 RADEX ABDOMEN 1 VIEW: CPT

## 2024-05-06 ENCOUNTER — HOSPITAL ENCOUNTER (OUTPATIENT)
Facility: HOSPITAL | Age: 69
Discharge: HOME | End: 2024-05-09
Payer: COMMERCIAL

## 2024-05-06 DIAGNOSIS — K59.09 CHRONIC CONSTIPATION: ICD-10-CM

## 2024-05-06 PROCEDURE — 74018 RADEX ABDOMEN 1 VIEW: CPT

## (undated) DEVICE — CATH IV AUTOGRD BC PNK 20GA 25 -- INSYTE

## (undated) DEVICE — CONTAINER SPEC 20 ML LID NEUT BUFF FORMALIN 10 % POLYPR STS

## (undated) DEVICE — CANN NASAL O2 CAPNOGRAPHY AD -- FILTERLINE

## (undated) DEVICE — 1200 GUARD II KIT W/5MM TUBE W/O VAC TUBE: Brand: GUARDIAN

## (undated) DEVICE — KIT COLON W/ 1.1OZ LUB AND 2 END

## (undated) DEVICE — SOLIDIFIER MEDC 1200ML -- CONVERT TO 356117

## (undated) DEVICE — BAG BELONG PT PERS CLEAR HANDL

## (undated) DEVICE — SET GRAV CK VLV NEEDLESS ST 3 GANGED 4WAY STPCOCK HI FLO 10

## (undated) DEVICE — ELECTRODE,RADIOTRANSLUCENT,FOAM,3PK: Brand: MEDLINE

## (undated) DEVICE — Device

## (undated) DEVICE — 3M™ CUROS™ DISINFECTING CAP FOR NEEDLELESS CONNECTORS 270/CARTON 20 CARTONS/CASE CFF1-270: Brand: CUROS™

## (undated) DEVICE — CUFF RMFG BP INF SZ 11 DISP -- LAWSON OEM ITEM 238915

## (undated) DEVICE — BAG SPEC BIOHZRD 10 X 10 IN --

## (undated) DEVICE — (D)SENSOR RMFG 02 PULS OXMTR -- DISC BY MFR USE ITEM 133445

## (undated) DEVICE — FCPS RAD JAW 4LC 240CM W/NDL -- BX/40